# Patient Record
Sex: FEMALE | Race: WHITE | NOT HISPANIC OR LATINO | Employment: OTHER | URBAN - METROPOLITAN AREA
[De-identification: names, ages, dates, MRNs, and addresses within clinical notes are randomized per-mention and may not be internally consistent; named-entity substitution may affect disease eponyms.]

---

## 2017-04-20 DIAGNOSIS — E78.5 HYPERLIPIDEMIA LDL GOAL <130: ICD-10-CM

## 2017-04-20 DIAGNOSIS — I10 BENIGN ESSENTIAL HYPERTENSION: ICD-10-CM

## 2017-04-20 NOTE — TELEPHONE ENCOUNTER
[facility]iac      Last Written Prescription Date: 10/25/16  Last Fill Quantity: 90, # refills: 1  Last Office Visit with [facility] or [facility] prescribing provider: 10/25/16  juana long         Potassium   Date Value Ref Range Status   11/03/2016 3.8 3.4 - 5.3 mmol/L Final     Creatinine   Date Value Ref Range Status   11/03/2016 0.92 0.52 - 1.04 mg/dL Final     BP Readings from Last 3 Encounters:   10/25/16 122/86   10/22/15 122/80   08/04/14 122/80     Lovastatin     Last Written Prescription Date: 10/25/16  Last Fill Quantity: 90, # refills: 1  Last Office Visit with [facility] or [facility] prescribing provider: 10/25/16  juana long         Lab Results   Component Value Date    CHOL 173 11/03/2016     Lab Results   Component Value Date    HDL 62 11/03/2016     Lab Results   Component Value Date    LDL 95 11/03/2016     Lab Results   Component Value Date    TRIG 79 11/03/2016     Lab Results   Component Value Date    CHOLHDLRATIO 2.7 10/16/2015

## 2017-04-21 RX ORDER — BISOPROLOL FUMARATE AND HYDROCHLOROTHIAZIDE 2.5; 6.25 MG/1; MG/1
TABLET ORAL
Qty: 90 TABLET | Refills: 1 | Status: SHIPPED | OUTPATIENT
Start: 2017-04-21 | End: 2017-10-26

## 2017-04-21 RX ORDER — LOVASTATIN 20 MG
TABLET ORAL
Qty: 90 TABLET | Refills: 1 | Status: SHIPPED | OUTPATIENT
Start: 2017-04-21 | End: 2017-10-26

## 2017-10-26 ENCOUNTER — OFFICE VISIT (OUTPATIENT)
Dept: FAMILY MEDICINE | Facility: CLINIC | Age: 67
End: 2017-10-26
Payer: COMMERCIAL

## 2017-10-26 VITALS
WEIGHT: 204 LBS | HEART RATE: 69 BPM | HEIGHT: 65 IN | BODY MASS INDEX: 33.99 KG/M2 | OXYGEN SATURATION: 98 % | SYSTOLIC BLOOD PRESSURE: 129 MMHG | TEMPERATURE: 97.5 F | RESPIRATION RATE: 12 BRPM | DIASTOLIC BLOOD PRESSURE: 80 MMHG

## 2017-10-26 DIAGNOSIS — Z12.31 ENCOUNTER FOR SCREENING MAMMOGRAM FOR BREAST CANCER: ICD-10-CM

## 2017-10-26 DIAGNOSIS — Z78.0 POST-MENOPAUSAL: ICD-10-CM

## 2017-10-26 DIAGNOSIS — Z12.11 SCREENING FOR MALIGNANT NEOPLASM OF COLON: ICD-10-CM

## 2017-10-26 DIAGNOSIS — Z00.00 MEDICARE ANNUAL WELLNESS VISIT, SUBSEQUENT: Primary | ICD-10-CM

## 2017-10-26 DIAGNOSIS — E78.5 HYPERLIPIDEMIA LDL GOAL <130: ICD-10-CM

## 2017-10-26 DIAGNOSIS — I10 HYPERTENSION GOAL BP (BLOOD PRESSURE) < 140/90: ICD-10-CM

## 2017-10-26 DIAGNOSIS — Z23 NEED FOR PROPHYLACTIC VACCINATION AND INOCULATION AGAINST INFLUENZA: ICD-10-CM

## 2017-10-26 LAB
ANION GAP SERPL CALCULATED.3IONS-SCNC: 8 MMOL/L (ref 3–14)
BUN SERPL-MCNC: 13 MG/DL (ref 7–30)
CALCIUM SERPL-MCNC: 9.6 MG/DL (ref 8.5–10.1)
CHLORIDE SERPL-SCNC: 102 MMOL/L (ref 94–109)
CHOLEST SERPL-MCNC: 212 MG/DL
CO2 SERPL-SCNC: 27 MMOL/L (ref 20–32)
CREAT SERPL-MCNC: 0.86 MG/DL (ref 0.52–1.04)
GFR SERPL CREATININE-BSD FRML MDRD: 66 ML/MIN/1.7M2
GLUCOSE SERPL-MCNC: 93 MG/DL (ref 70–99)
HDLC SERPL-MCNC: 99 MG/DL
LDLC SERPL CALC-MCNC: 98 MG/DL
NONHDLC SERPL-MCNC: 113 MG/DL
POTASSIUM SERPL-SCNC: 3.9 MMOL/L (ref 3.4–5.3)
SODIUM SERPL-SCNC: 137 MMOL/L (ref 133–144)
TRIGL SERPL-MCNC: 75 MG/DL

## 2017-10-26 PROCEDURE — 90662 IIV NO PRSV INCREASED AG IM: CPT | Performed by: FAMILY MEDICINE

## 2017-10-26 PROCEDURE — 36415 COLL VENOUS BLD VENIPUNCTURE: CPT | Performed by: FAMILY MEDICINE

## 2017-10-26 PROCEDURE — G0008 ADMIN INFLUENZA VIRUS VAC: HCPCS | Performed by: FAMILY MEDICINE

## 2017-10-26 PROCEDURE — 80048 BASIC METABOLIC PNL TOTAL CA: CPT | Performed by: FAMILY MEDICINE

## 2017-10-26 PROCEDURE — 80061 LIPID PANEL: CPT | Performed by: FAMILY MEDICINE

## 2017-10-26 PROCEDURE — G0438 PPPS, INITIAL VISIT: HCPCS | Performed by: FAMILY MEDICINE

## 2017-10-26 RX ORDER — BISOPROLOL FUMARATE AND HYDROCHLOROTHIAZIDE 2.5; 6.25 MG/1; MG/1
1 TABLET ORAL DAILY
Qty: 90 TABLET | Refills: 3 | Status: SHIPPED | OUTPATIENT
Start: 2017-10-26 | End: 2018-08-10

## 2017-10-26 RX ORDER — LOVASTATIN 20 MG
20 TABLET ORAL AT BEDTIME
Qty: 90 TABLET | Refills: 3 | Status: SHIPPED | OUTPATIENT
Start: 2017-10-26 | End: 2018-08-10

## 2017-10-26 NOTE — NURSING NOTE
"Chief Complaint   Patient presents with     Medicare Visit       Initial /80  Pulse 69  Temp 97.5  F (36.4  C) (Oral)  Resp 12  Ht 5' 5.25\" (1.657 m)  Wt 204 lb (92.5 kg)  SpO2 98%  BMI 33.69 kg/m2 Estimated body mass index is 33.69 kg/(m^2) as calculated from the following:    Height as of this encounter: 5' 5.25\" (1.657 m).    Weight as of this encounter: 204 lb (92.5 kg).  Medication Reconciliation: complete       Gladys Kirk MA     "

## 2017-10-26 NOTE — MR AVS SNAPSHOT
After Visit Summary   10/26/2017    Serena Reno    MRN: [id number]           Patient Information     Date Of Birth          1950        Visit Information        Provider Department      10/26/2017 9:20 AM Zoya Elizabeth MD [facility]        Today's Diagnoses     Medicare annual wellness visit, subsequent    -  1    Hypertension goal BP (blood pressure) < 140/90        Hyperlipidemia LDL goal <130        Post-menopausal        Encounter for screening mammogram for breast cancer        Screening for malignant neoplasm of colon        Need for prophylactic vaccination and inoculation against influenza          Care Instructions    1. Switch to a baby aspirin 81 mg.  2. Schedule mammogram.  3. Schedule colonoscopy at [facility].  4. Schedule a bone density scan.  5. Bring your Advanced Directive into clinic when you have time so we can have a copy on file.      Preventive Health Recommendations    Female Ages 65 +    Yearly exam:     See your health care provider every year in order to  o Review health changes.   o Discuss preventive care.    o Review your medicines if your doctor has prescribed any.      You no longer need a yearly Pap test unless you've had an abnormal Pap test in the past 10 years. If you have vaginal symptoms, such as bleeding or discharge, be sure to talk with your provider about a Pap test.      Every 1 to 2 years, have a mammogram.  If you are over 69, talk with your health care provider about whether or not you want to continue having screening mammograms.      Every 10 years, have a colonoscopy. Or, have a yearly FIT test (stool test). These exams will check for colon cancer.       Have a cholesterol test every 5 years, or more often if your doctor advises it.       Have a diabetes test (fasting glucose) every three years. If you are at risk for diabetes, you should have this test more often.       At age 65, have a bone density scan (DEXA) to check for  osteoporosis (brittle bone disease).    Shots:    Get a flu shot each year.    Get a tetanus shot every 10 years.    Talk to your doctor about your pneumonia vaccines. There are now two you should receive - Pneumovax (PPSV 23) and Prevnar (PCV 13).    Talk to your doctor about the shingles vaccine.    Talk to your doctor about the hepatitis B vaccine.    Nutrition:     Eat at least 5 servings of fruits and vegetables each day.      Eat whole-grain bread, whole-wheat pasta and brown rice instead of white grains and rice.      Talk to your provider about Calcium and Vitamin D.     Lifestyle    Exercise at least 150 minutes a week (30 minutes a day, 5 days a week). This will help you control your weight and prevent disease.      Limit alcohol to one drink per day.      No smoking.       Wear sunscreen to prevent skin cancer.       See your dentist twice a year for an exam and cleaning.      See your eye doctor every 1 to 2 years to screen for conditions such as glaucoma, macular degeneration and cataracts.      You need a colonoscopy:  This is a lifesaving screening test - please call to set up an appointment today.  [phone] ([facility])  or   [phone] ([address])           Follow-ups after your visit        Additional Services     GASTROENTEROLOGY ADULT REF PROCEDURE ONLY       Last Lab Result: Creatinine (mg/dL)       Date                     Value                 11/03/2016               0.92             ----------  There is no height or weight on file to calculate BMI.      Patient will be contacted to schedule procedure.     Please be aware that coverage of these services is subject to the terms and limitations of your health insurance plan.  Call member services at your health plan with any benefit or coverage questions.  Any procedures must be performed at a [facility] facility OR coordinated by your clinic's referral office.    Please bring the following with you to your  appointment:    (1) Any X-Rays, CTs or MRIs which have been performed.  Contact the facility where they were done to arrange for  prior to your scheduled appointment.    (2) List of current medications   (3) This referral request   (4) Any documents/labs given to you for this referral                  Future tests that were ordered for you today     Open Future Orders        Priority Expected Expires Ordered    *MA Screening Digital Bilateral Routine  10/26/2018 10/26/2017    DX Hip/Pelvis/Spine Routine  10/26/2018 10/26/2017            Who to contact     If you have questions or need follow up information about today's clinic visit or your schedule please contact [facility] directly at [phone].  Normal or non-critical lab and imaging results will be communicated to you by [employer], letter or phone within 4 business days after the clinic has received the results. If you do not hear from us within 7 days, please contact the clinic through [employer] or phone. If you have a critical or abnormal lab result, we will notify you by phone as soon as possible.  Submit refill requests through [employer] or call your pharmacy and they will forward the refill request to us. Please allow 3 business days for your refill to be completed.          Additional Information About Your Visit        [employer] Information     [employer] gives you secure access to your electronic health record. If you see a primary care provider, you can also send messages to your care team and make appointments. If you have questions, please call your primary care clinic.  If you do not have a primary care provider, please call [phone] and they will assist you.        Care EveryWhere ID     This is your Care EveryWhere ID. This could be used by other organizations to access your [facility] medical records  [id number]        Your Vitals Were     Pulse Temperature Respirations Height Pulse Oximetry BMI (Body Mass Index)    69 97.5  F  "(36.4  C) (Oral) 12 5' 5.25\" (1.657 m) 98% 33.69 kg/m2       Blood Pressure from Last 3 Encounters:   10/26/17 129/80   10/25/16 122/86   10/22/15 122/80    Weight from Last 3 Encounters:   10/26/17 204 lb (92.5 kg)   10/25/16 202 lb 8 oz (91.9 kg)   10/22/15 197 lb 4 oz (89.5 kg)              We Performed the Following     Basic metabolic panel  (Ca, Cl, CO2, Creat, Gluc, K, Na, BUN)     FLU VACCINE, INCREASED ANTIGEN, PRESV FREE, AGE 65+ [91076]     GASTROENTEROLOGY ADULT REF PROCEDURE ONLY     Lipid panel reflex to direct LDL Fasting     Vaccine Administration, Initial [17781]          Today's Medication Changes          These changes are accurate as of: 10/26/17  9:59 AM.  If you have any questions, ask your nurse or doctor.               These medicines have changed or have updated prescriptions.        Dose/Directions    bisoprolol-hydrochlorothiazide 2.5-6.25 MG per tablet   Commonly known as:  ZIAC   This may have changed:  See the new instructions.   Used for:  Hypertension goal BP (blood pressure) < 140/90   Changed by:  Zoya Elizabeth MD        Dose:  1 tablet   Take 1 tablet by mouth daily   Quantity:  90 tablet   Refills:  3       lovastatin 20 MG tablet   Commonly known as:  MEVACOR   This may have changed:  See the new instructions.   Used for:  Hyperlipidemia LDL goal <130   Changed by:  Zoya Elizabeth MD        Dose:  20 mg   Take 1 tablet (20 mg) by mouth At Bedtime   Quantity:  90 tablet   Refills:  3            Where to get your medicines      These medications were sent to [facility] - [place], [place] - [address]  [address], [facility] [address]     Phone:  [phone]     bisoprolol-hydrochlorothiazide 2.5-6.25 MG per tablet    lovastatin 20 MG tablet                Primary Care Provider Office Phone # Fax #    Zoya Elizabeth [phone] [address] [address]        Equal Access to Services     UGO BUENROSTRO AH: Hadii " [address], wajackieda lubriceadaha, qaybta kaalmada [place], waxdeanna zahra olivowalodmiriam clement mia. So [facility] [phone].    ATENCIÓN: Si habla willyañol, tiene a chaves disposición servicios gratuitos de asistencia lingüística. Melissaame al [phone].    We comply with applicable federal civil rights laws and [place] laws. We do not discriminate on the basis of race, color, national origin, age, disability, sex, sexual orientation, or gender identity.            Thank you!     Thank you for choosing [facility]  for your care. Our goal is always to provide you with excellent care. Hearing back from our patients is one way we can continue to improve our services. Please take a few minutes to complete the written survey that you may receive in the mail after your visit with us. Thank you!             Your Updated Medication List - Protect others around you: Learn how to safely use, store and throw away your medicines at www.disposemymeds.org.          This list is accurate as of: 10/26/17  9:59 AM.  Always use your most recent med list.                   Brand Name Dispense Instructions for use Diagnosis    aspirin 325 MG tablet      Take 1 tablet by mouth daily.        bisoprolol-hydrochlorothiazide 2.5-6.25 MG per tablet    ZIAC    90 tablet    Take 1 tablet by mouth daily    Hypertension goal BP (blood pressure) < 140/90       co-enzyme Q-10 100 MG Caps capsule      Take 1 capsule by mouth daily.        GLUCOSAMINE CHONDR 1500 COMPLX PO      Take 1 capsule by mouth daily.        lovastatin 20 MG tablet    MEVACOR    90 tablet    Take 1 tablet (20 mg) by mouth At Bedtime    Hyperlipidemia LDL goal <130       MULTIVITAMINS PO      Take 1 capsule by mouth daily. Adult 50 plus        omega-3 fatty acids 1200 MG capsule      Take 1 capsule by mouth daily.        PRESERVISION AREDS 2 Caps      Take 2 capsules by mouth daily.        Vitamin C 500 MG Caps      Take 1 capsule by mouth daily.         vitamin E 400 UNIT capsule      Take 1 capsule by mouth daily.

## 2017-10-26 NOTE — PROGRESS NOTES
SUBJECTIVE:   Serena Reno is a 67 year old female who presents for Preventive Visit.  Are you in the first 12 months of your Medicare coverage?  No    Physical   Annual:     Getting at least 3 servings of Calcium per day::  Yes    Bi-annual eye exam::  Yes    Dental care twice a year::  Yes    Sleep apnea or symptoms of sleep apnea::  None    Diet::  Regular (no restrictions)    Frequency of exercise::  2-3 days/week    Duration of exercise::  30-45 minutes    Taking medications regularly::  Yes    Medication side effects::  None    Additional concerns today::  No    COGNITIVE SCREEN  1) Repeat 3 items (Banana, Sunrise, Chair)    2) Clock draw: NORMAL  3) 3 item recall: Recalls 3 objects  Results: 3 items recalled: COGNITIVE IMPAIRMENT LESS LIKELY  Mini-CogTM Copyright S Saray. Licensed by the author for use in [employer]; reprinted with permission (margie@[facility]). All rights reserved.      Answers for HPI/ROS submitted by the patient on 10/26/2017   PHQ-2 Score: 0      She woke up this morning with a hoarse voice from post nasal drip, which happens seasonally. She does not feel sick.    Patient has her Advanced Directive completed and will bring it into clinic to copy.     She recalls having a DEXA scan in 2011 and findings were normal.     She is exercising- walking daily and going to [employer] 2-3 times a week to do strength training exercises.      Reviewed and updated as needed this visit by clinical staff  Tobacco  Allergies  Meds  Med Hx  Surg Hx  Fam Hx  Soc Hx      Reviewed and updated as needed this visit by Provider        Social History   Substance Use Topics     Smoking status: Never Smoker     Smokeless tobacco: Never Used     Alcohol use Yes      Comment: wine 7 glasses a week     The patient does not drink >3 drinks per day nor >7 drinks per week.    Today's PHQ-2 Score:   PHQ-2 ( 1999 Pfizer) 10/26/2017   Q1: Little interest or pleasure in doing things 0   Q2: Feeling down,  depressed or hopeless 0   PHQ-2 Score 0   Q1: Little interest or pleasure in doing things Not at all   Q2: Feeling down, depressed or hopeless Not at all   PHQ-2 Score 0     Do you feel safe in your environment - Yes    Do you have a Health Care Directive?: Yes: Patient states has Advance Directive and will bring in a copy to clinic.    Current providers sharing in care for this patient include:   Patient Care Team:  Zoya Elizabeth MD as PCP - General (Family Practice)      Hearing impairment: No    Ability to successfully perform activities of daily living: Yes, no assistance needed     Fall risk:  Fallen 2 or more times in the past year?: No  Any fall with injury in the past year?: No    Home safety:  none identified    The following health maintenance items are reviewed in Epic and correct as of today:  Health Maintenance   Topic Date Due     BMP Q6 MOS  05/03/2017     ADVANCE DIRECTIVE PLANNING Q5 YRS  07/30/2017     INFLUENZA VACCINE (SYSTEM ASSIGNED)  09/01/2017     MAMMO SCREEN Q2 YR (SYSTEM ASSIGNED)  10/20/2017     FALL RISK ASSESSMENT  10/25/2017     LIPID MONITORING Q1 YEAR  11/03/2017     COLON CANCER SCREEN (SYSTEM ASSIGNED)  01/01/2018     TETANUS IMMUNIZATION (SYSTEM ASSIGNED)  01/01/2020     DEXA SCAN SCREENING (SYSTEM ASSIGNED)  Completed     PNEUMOCOCCAL  Completed     HEPATITIS C SCREENING  Completed     BP Readings from Last 3 Encounters:   10/26/17 129/80   10/25/16 122/86   10/22/15 122/80    Wt Readings from Last 3 Encounters:   10/26/17 92.5 kg (204 lb)   10/25/16 91.9 kg (202 lb 8 oz)   10/22/15 89.5 kg (197 lb 4 oz)         Patient Active Problem List   Diagnosis     Hypertension goal BP (blood pressure) < 140/90     Hyperlipidemia with target LDL less than 130     Borderline abnormal thyroid function test     Advanced directives, counseling/discussion     Past Surgical History:   Procedure Laterality Date     DILATION AND CURETTAGE      after missed AB       Social History   Substance  Use Topics     Smoking status: Never Smoker     Smokeless tobacco: Never Used     Alcohol use Yes      Comment: wine 7 glasses a week     History reviewed. No pertinent family history.      Current Outpatient Prescriptions   Medication Sig Dispense Refill     bisoprolol-hydrochlorothiazide (ZIAC) 2.5-6.25 MG per tablet TAKE 1 TABLET EVERY DAY 90 tablet 1     lovastatin (MEVACOR) 20 MG tablet TAKE 1 TABLET AT BEDTIME 90 tablet 1     Multiple Vitamin (MULTIVITAMINS PO) Take 1 capsule by mouth daily. Adult 50 plus       co-enzyme Q-10 (CO Q 10) 100 MG CAPS Take 1 capsule by mouth daily.       Glucosamine-Chondroit-Vit C-Mn (GLUCOSAMINE CHONDR 1500 COMPLX PO) Take 1 capsule by mouth daily.       aspirin 325 MG tablet Take 1 tablet by mouth daily.       omega-3 fatty acids (FISH OIL) 1200 MG capsule Take 1 capsule by mouth daily.       Ascorbic Acid (VITAMIN C) 500 MG CAPS Take 1 capsule by mouth daily.       vitamin E 400 UNIT capsule Take 1 capsule by mouth daily.       Multiple Vitamins-Minerals (PRESERVISION AREDS 2) CAPS Take 2 capsules by mouth daily.       No Known Allergies  Recent Labs   Lab Test  11/03/16   0929  10/16/15   0859  08/05/14   0850  08/01/13   0927  07/30/12   0931   LDL  95  89  112  94  90   HDL  62  63  84  73  80   TRIG  79  80  72  73  66   ALT   --   44  22  26  13   CR  0.92  0.83  0.89  0.86  0.78   GFRESTIMATED  61  69  64  67  75   GFRESTBLACK  74  83  77  81  >90   POTASSIUM  3.8  3.7  3.8  4.2  3.9   TSH   --    --    --   1.47  1.13      Pneumonia Vaccine: COMPLETED   Mammogram Screening: Patient over age 50, mutual decision to screen reflected in health maintenance.  History of abnormal Pap smear:   NO - age 65 - see link Cervical Cytology Screening Guidelines Last 3 Pap Results:   PAP (no units)   Date Value   08/04/2014 NIL         Review of Systems   ROS: 10 point ROS neg other than the symptoms noted above in the HPI.    This document serves as a record of the services and  "decisions personally performed and made by Zoya Elizabeth MD. It was created on his/her behalf by Hillary Contreras, trained medical scribe. The creation of this document is based the provider's statements to the medical scribes.    Scribpierre Contreras, October 26, 2017  OBJECTIVE:   /80  Pulse 69  Temp 97.5  F (36.4  C) (Oral)  Resp 12  Ht 1.657 m (5' 5.25\")  Wt 92.5 kg (204 lb)  SpO2 98%  BMI 33.69 kg/m2 Estimated body mass index is 33.69 kg/(m^2) as calculated from the following:    Height as of this encounter: 1.657 m (5' 5.25\").    Weight as of this encounter: 92.5 kg (204 lb).  Physical Exam  GENERAL APPEARANCE: healthy, alert and no distress  EYES: Eyes grossly normal to inspection, PERRL and conjunctivae and sclerae normal  HENT: ear canals and TM's normal, nose and mouth without ulcers or lesions, oropharynx clear and oral mucous membranes moist  NECK: no adenopathy, no asymmetry, masses, or scars and thyroid normal to palpation  RESP: lungs clear to auscultation - no rales, rhonchi or wheezes  BREAST: normal without masses, tenderness or nipple discharge and no palpable axillary masses or adenopathy  CV: regular rate and rhythm, normal S1 S2, no S3 or S4, no murmur, click or rub, no peripheral edema and peripheral pulses strong  ABDOMEN: soft, nontender, no hepatosplenomegaly, no masses and bowel sounds normal  MS: no musculoskeletal defects are noted and gait is age appropriate without ataxia  SKIN: no suspicious lesions or rashes  NEURO: Normal strength and tone, sensory exam grossly normal, mentation intact and speech normal  PSYCH: mentation appears normal and affect normal/bright  ASSESSMENT / PLAN:   1. Medicare annual wellness visit, subsequent  She will schedule mammo, dexa, colonoscopy. Fasting labs today. Health maintenance utd     2. Hypertension goal BP (blood pressure) < 140/90  Controlled. Continues on bisoprolol-HCTZ 2.5-6.25 mg daily.   - Basic metabolic panel  (Ca, Cl, CO2, " "Creat, Gluc, K, Na, BUN)  - bisoprolol-hydrochlorothiazide (ZIAC) 2.5-6.25 MG per tablet; Take 1 tablet by mouth daily  Dispense: 90 tablet; Refill: 3    3. Hyperlipidemia LDL goal <130  Stable. Continues on lovastatin 20 mg daily.  - Lipid panel reflex to direct LDL Fasting  - lovastatin (MEVACOR) 20 MG tablet; Take 1 tablet (20 mg) by mouth At Bedtime  Dispense: 90 tablet; Refill: 3    4. Post-menopausal    - DX Hip/Pelvis/Spine; Future    5. Encounter for screening mammogram for breast cancer    - *MA Screening Digital Bilateral; Future    6. Screening for malignant neoplasm of colon    - GASTROENTEROLOGY ADULT REF PROCEDURE ONLY    7. Need for prophylactic vaccination and inoculation against influenza    - FLU VACCINE, INCREASED ANTIGEN, PRESV FREE, AGE 65+ [49138]  - Vaccine Administration, Initial [70338]      End of Life Planning:  Patient currently has an advanced directive: Yes.  Practitioner is supportive of decision.    COUNSELING:  Reviewed preventive health counseling, as reflected in patient instructions  Estimated body mass index is 33.69 kg/(m^2) as calculated from the following:    Height as of this encounter: 1.657 m (5' 5.25\").    Weight as of this encounter: 92.5 kg (204 lb).  Weight management plan: Discussed healthy diet and exercise guidelines and patient will follow up in 12 months in clinic to re-evaluate.   reports that she has never smoked. She has never used smokeless tobacco.    Appropriate preventive services were discussed with this patient, including applicable screening as appropriate for cardiovascular disease, diabetes, osteopenia/osteoporosis, and glaucoma.  As appropriate for age/gender, discussed screening for colorectal cancer, prostate cancer, breast cancer, and cervical cancer. Checklist reviewing preventive services available has been given to the patient.    Reviewed patients plan of care and provided an AVS. The Basic Care Plan (routine screening as documented in Health " Maintenance) for Serena meets the Care Plan requirement. This Care Plan has been established and reviewed with the Patient.    Counseling Resources:  ATP IV Guidelines  Pooled Cohorts Equation Calculator  Breast Cancer Risk Calculator  FRAX Risk Assessment  ICSI Preventive Guidelines  Dietary Guidelines for Americans, 2010  USDA's MyPlate  ASA Prophylaxis  Lung CA Screening    The information in this document, created by the medical scribe for me, accurately reflects the services I personally performed and the decisions made by me. I have reviewed and approved this document for accuracy. 10/26/17    Zoya Elizabeth MD  [facility]      Injectable Influenza Immunization Documentation    1.  Is the person to be vaccinated sick today?   No    2. Does the person to be vaccinated have an allergy to a component   of the vaccine?   No  Egg Allergy Algorithm Link    3. Has the person to be vaccinated ever had a serious reaction   to influenza vaccine in the past?   No    4. Has the person to be vaccinated ever had Guillain-Barré syndrome?   No    Form completed by Gladys Kirk MA

## 2017-10-26 NOTE — NURSING NOTE
Per orders of Dr. Elizabeth, injection of flu shot  given by OMARI VENTURA Patient instructed to remain in clinic for 15 minutes afterwards, and to report any adverse reaction to me immediately.

## 2017-10-26 NOTE — PATIENT INSTRUCTIONS
1. Switch to a baby aspirin 81 mg.  2. Schedule mammogram.  3. Schedule colonoscopy at [facility].  4. Schedule a bone density scan.  5. Bring your Advanced Directive into clinic when you have time so we can have a copy on file.      Preventive Health Recommendations    Female Ages 65 +    Yearly exam:     See your health care provider every year in order to  o Review health changes.   o Discuss preventive care.    o Review your medicines if your doctor has prescribed any.      You no longer need a yearly Pap test unless you've had an abnormal Pap test in the past 10 years. If you have vaginal symptoms, such as bleeding or discharge, be sure to talk with your provider about a Pap test.      Every 1 to 2 years, have a mammogram.  If you are over 69, talk with your health care provider about whether or not you want to continue having screening mammograms.      Every 10 years, have a colonoscopy. Or, have a yearly FIT test (stool test). These exams will check for colon cancer.       Have a cholesterol test every 5 years, or more often if your doctor advises it.       Have a diabetes test (fasting glucose) every three years. If you are at risk for diabetes, you should have this test more often.       At age 65, have a bone density scan (DEXA) to check for osteoporosis (brittle bone disease).    Shots:    Get a flu shot each year.    Get a tetanus shot every 10 years.    Talk to your doctor about your pneumonia vaccines. There are now two you should receive - Pneumovax (PPSV 23) and Prevnar (PCV 13).    Talk to your doctor about the shingles vaccine.    Talk to your doctor about the hepatitis B vaccine.    Nutrition:     Eat at least 5 servings of fruits and vegetables each day.      Eat whole-grain bread, whole-wheat pasta and brown rice instead of white grains and rice.      Talk to your provider about Calcium and Vitamin D.     Lifestyle    Exercise at least 150 minutes a week (30 minutes a day, 5 days a week). This  will help you control your weight and prevent disease.      Limit alcohol to one drink per day.      No smoking.       Wear sunscreen to prevent skin cancer.       See your dentist twice a year for an exam and cleaning.      See your eye doctor every 1 to 2 years to screen for conditions such as glaucoma, macular degeneration and cataracts.      You need a colonoscopy:  This is a lifesaving screening test - please call to set up an appointment today.  [phone] ([facility])  or   [phone] (Get In - [address] [place] [address])

## 2017-10-30 NOTE — PROGRESS NOTES
The results of your recent lipid (cholesterol) profile were normal.    Here are the results:  Lab Results       Component                Value               Date                       CHOL                     212                 10/26/2017            Lab Results       Component                Value               Date                       HDL                      99                  10/26/2017            Lab Results       Component                Value               Date                       LDL                      98                  10/26/2017            Lab Results       Component                Value               Date                       TRIG                     75                  10/26/2017            Lab Results       Component                Value               Date                       CHOLHDLRATIO             2.7                 10/16/2015              Desired or goal levels are:  CHOLESTEROL: Desirable is less than 200.   HDL (Good Cholesterol): Desirable is greater than 40 (for men) greater than 50 (for women).  LDL (Bad Cholesterol): Desirable is less than 130 (or less than 100 if you have heart disease or diabetes). Borderline 130-160.  TRIGLYCERIDES: Desirable is less than 150.  Borderline is 150-200.    Keep up the good work!.    As you may know, an elevated cholesterol is one factor that increases your risk for heart disease and stroke. You can improve your cholesterol by controlling the amount and type of fat you eat and by increasing your daily activity level.    Here are some ways to improve your nutrition:  Eat less fat (especially butter, Crisco and other saturated fats)  Buy lean cuts of meat, reduce your portions of red meat or substitute poultry or fish  Use skim milk and low-fat dairy products  Eat no more than 4 egg yolks per week  Avoid fried or fast foods that are high in fat  Eat more fruits and vegetables      Also consider starting or increasing your aerobic activity. Aerobic  activity is the best way to improve HDL (good) cholesterol. If this would be new to you, please talk with me first about what activities are safe for you.      Other lab results:    The testing of your blood sugar, kidney function, and electrolytes was normal.      Please feel free to contact us with any questions or if you would like more information.    Zoya Elizabeth M.D.

## 2017-10-31 ENCOUNTER — RADIANT APPOINTMENT (OUTPATIENT)
Dept: MAMMOGRAPHY | Facility: CLINIC | Age: 67
End: 2017-10-31
Payer: COMMERCIAL

## 2017-10-31 DIAGNOSIS — Z12.31 VISIT FOR SCREENING MAMMOGRAM: ICD-10-CM

## 2017-10-31 PROCEDURE — G0202 SCR MAMMO BI INCL CAD: HCPCS | Mod: TC

## 2018-12-08 ENCOUNTER — TRANSFERRED RECORDS (OUTPATIENT)
Dept: HEALTH INFORMATION MANAGEMENT | Facility: CLINIC | Age: 68
End: 2018-12-08

## 2018-12-08 LAB — HEMOCCULT STL QL IA: NEGATIVE

## 2018-12-17 ENCOUNTER — ANCILLARY PROCEDURE (OUTPATIENT)
Dept: MAMMOGRAPHY | Facility: CLINIC | Age: 68
End: 2018-12-17
Attending: FAMILY MEDICINE
Payer: COMMERCIAL

## 2018-12-17 DIAGNOSIS — Z12.31 VISIT FOR SCREENING MAMMOGRAM: ICD-10-CM

## 2018-12-17 PROCEDURE — 77067 SCR MAMMO BI INCL CAD: CPT | Mod: TC

## 2018-12-18 ASSESSMENT — ENCOUNTER SYMPTOMS
ARTHRALGIAS: 1
EYE PAIN: 0
PARESTHESIAS: 0
SHORTNESS OF BREATH: 0
DIARRHEA: 0
HEADACHES: 0
NAUSEA: 0
COUGH: 0
WEAKNESS: 0
MYALGIAS: 0
NERVOUS/ANXIOUS: 0
HEARTBURN: 0
BREAST MASS: 0
DYSURIA: 0
JOINT SWELLING: 0
FEVER: 0
CONSTIPATION: 0
HEMATURIA: 0
FREQUENCY: 0
DIZZINESS: 0
ABDOMINAL PAIN: 0
HEMATOCHEZIA: 0
CHILLS: 0
SORE THROAT: 0
PALPITATIONS: 0

## 2018-12-18 ASSESSMENT — ACTIVITIES OF DAILY LIVING (ADL): CURRENT_FUNCTION: NO ASSISTANCE NEEDED

## 2018-12-20 ENCOUNTER — OFFICE VISIT (OUTPATIENT)
Dept: FAMILY MEDICINE | Facility: CLINIC | Age: 68
End: 2018-12-20
Payer: COMMERCIAL

## 2018-12-20 VITALS
HEART RATE: 63 BPM | HEIGHT: 65 IN | BODY MASS INDEX: 34.66 KG/M2 | OXYGEN SATURATION: 97 % | DIASTOLIC BLOOD PRESSURE: 84 MMHG | TEMPERATURE: 97.8 F | SYSTOLIC BLOOD PRESSURE: 143 MMHG | WEIGHT: 208 LBS | RESPIRATION RATE: 16 BRPM

## 2018-12-20 DIAGNOSIS — Z00.00 ENCOUNTER FOR ROUTINE ADULT HEALTH EXAMINATION WITHOUT ABNORMAL FINDINGS: Primary | ICD-10-CM

## 2018-12-20 DIAGNOSIS — E78.5 HYPERLIPIDEMIA LDL GOAL <130: ICD-10-CM

## 2018-12-20 DIAGNOSIS — I10 HYPERTENSION GOAL BP (BLOOD PRESSURE) < 140/90: ICD-10-CM

## 2018-12-20 DIAGNOSIS — Z78.0 POSTMENOPAUSAL: ICD-10-CM

## 2018-12-20 DIAGNOSIS — E83.52 SERUM CALCIUM ELEVATED: ICD-10-CM

## 2018-12-20 DIAGNOSIS — B00.1 COLD SORE: ICD-10-CM

## 2018-12-20 DIAGNOSIS — Z12.11 COLON CANCER SCREENING: ICD-10-CM

## 2018-12-20 LAB
ANION GAP SERPL CALCULATED.3IONS-SCNC: 6 MMOL/L (ref 3–14)
BUN SERPL-MCNC: 16 MG/DL (ref 7–30)
CALCIUM SERPL-MCNC: 10.3 MG/DL (ref 8.5–10.1)
CHLORIDE SERPL-SCNC: 104 MMOL/L (ref 94–109)
CHOLEST SERPL-MCNC: 245 MG/DL
CO2 SERPL-SCNC: 28 MMOL/L (ref 20–32)
CREAT SERPL-MCNC: 0.87 MG/DL (ref 0.52–1.04)
CREAT UR-MCNC: 66 MG/DL
GFR SERPL CREATININE-BSD FRML MDRD: 68 ML/MIN/{1.73_M2}
GLUCOSE SERPL-MCNC: 97 MG/DL (ref 70–99)
HDLC SERPL-MCNC: 79 MG/DL
LDLC SERPL CALC-MCNC: 145 MG/DL
MICROALBUMIN UR-MCNC: <5 MG/L
MICROALBUMIN/CREAT UR: NORMAL MG/G CR (ref 0–25)
NONHDLC SERPL-MCNC: 166 MG/DL
POTASSIUM SERPL-SCNC: 4.1 MMOL/L (ref 3.4–5.3)
SODIUM SERPL-SCNC: 138 MMOL/L (ref 133–144)
TRIGL SERPL-MCNC: 105 MG/DL

## 2018-12-20 PROCEDURE — 99387 INIT PM E/M NEW PAT 65+ YRS: CPT | Mod: 25 | Performed by: FAMILY MEDICINE

## 2018-12-20 PROCEDURE — 80048 BASIC METABOLIC PNL TOTAL CA: CPT | Performed by: FAMILY MEDICINE

## 2018-12-20 PROCEDURE — 90715 TDAP VACCINE 7 YRS/> IM: CPT | Performed by: FAMILY MEDICINE

## 2018-12-20 PROCEDURE — 36415 COLL VENOUS BLD VENIPUNCTURE: CPT | Performed by: FAMILY MEDICINE

## 2018-12-20 PROCEDURE — 90471 IMMUNIZATION ADMIN: CPT | Performed by: FAMILY MEDICINE

## 2018-12-20 PROCEDURE — 82043 UR ALBUMIN QUANTITATIVE: CPT | Performed by: FAMILY MEDICINE

## 2018-12-20 PROCEDURE — 80061 LIPID PANEL: CPT | Performed by: FAMILY MEDICINE

## 2018-12-20 RX ORDER — ACYCLOVIR 50 MG/G
OINTMENT TOPICAL
Qty: 15 G | Refills: 1 | Status: SHIPPED | OUTPATIENT
Start: 2018-12-20 | End: 2018-12-27

## 2018-12-20 RX ORDER — LOVASTATIN 20 MG
20 TABLET ORAL AT BEDTIME
Qty: 90 TABLET | Refills: 3 | Status: SHIPPED | OUTPATIENT
Start: 2018-12-20 | End: 2019-12-19

## 2018-12-20 RX ORDER — BISOPROLOL FUMARATE AND HYDROCHLOROTHIAZIDE 2.5; 6.25 MG/1; MG/1
TABLET ORAL
Qty: 90 TABLET | Refills: 3 | Status: SHIPPED | OUTPATIENT
Start: 2018-12-20 | End: 2019-12-19

## 2018-12-20 ASSESSMENT — ENCOUNTER SYMPTOMS
SORE THROAT: 0
EYE PAIN: 0
PARESTHESIAS: 0
WEAKNESS: 0
ABDOMINAL PAIN: 0
HEMATURIA: 0
FEVER: 0
COUGH: 0
DIARRHEA: 0
DIZZINESS: 0
DYSURIA: 0
ARTHRALGIAS: 1
CONSTIPATION: 0
HEADACHES: 0
MYALGIAS: 0
JOINT SWELLING: 0
PALPITATIONS: 0
SHORTNESS OF BREATH: 0
FREQUENCY: 0
HEMATOCHEZIA: 0
NERVOUS/ANXIOUS: 0
HEARTBURN: 0
NAUSEA: 0
CHILLS: 0
BREAST MASS: 0

## 2018-12-20 ASSESSMENT — MIFFLIN-ST. JEOR: SCORE: 1466.42

## 2018-12-20 ASSESSMENT — ACTIVITIES OF DAILY LIVING (ADL): CURRENT_FUNCTION: NO ASSISTANCE NEEDED

## 2018-12-20 NOTE — NURSING NOTE
Prior to injection, verified patient identity using patient's name and date of birth.  Due to injection administration, patient instructed to remain in clinic for 15 minutes  afterwards, and to report any adverse reaction to me immediately.      Screening Questionnaire for Adult Immunization    Are you sick today?   No   Do you have allergies to medications, food, a vaccine component or latex?   No   Have you ever had a serious reaction after receiving a vaccination?   No   Do you have a long-term health problem with heart disease, lung disease, asthma, kidney disease, metabolic disease (e.g. diabetes), anemia, or other blood disorder?   No   Do you have cancer, leukemia, HIV/AIDS, or any other immune system problem?   No   In the past 3 months, have you taken medications that affect  your immune system, such as prednisone, other steroids, or anticancer drugs; drugs for the treatment of rheumatoid arthritis, Crohn s disease, or psoriasis; or have you had radiation treatments?   No   Have you had a seizure, or a brain or other nervous system problem?   No   During the past year, have you received a transfusion of blood or blood     products, or been given immune (gamma) globulin or antiviral drug?   No   For women: Are you pregnant or is there a chance you could become        pregnant during the next month?   No   Have you received any vaccinations in the past 4 weeks?   No     Immunization questionnaire answers were all negative.        Per orders of Dr. Elizabeth, injection of Tdap given by Brad Isaac. Patient instructed to remain in clinic for 15 minutes afterwards, and to report any adverse reaction to me immediately.       Screening performed by Brad Isaac on 12/20/2018 at 10:28 AM.

## 2018-12-20 NOTE — PATIENT INSTRUCTIONS
1) I recommend getting the new shingles vaccine, Shingrix.  You can call your insurance company to find out if this vaccine is covered.  You may also ask our pharmacy to check if your insurance covers Shingrix if given at the pharmacy.   2) Schedule your bone density test.  3) Tomorrow, you have committed to start exercising with your walking tape once daily. Have your clothes ready by your bed in the morning. You can do it!!         Preventive Health Recommendations    See your health care provider every year to    Review health changes.     Discuss preventive care.      Review your medicines if your doctor has prescribed any.      You no longer need a yearly Pap test unless you've had an abnormal Pap test in the past 10 years. If you have vaginal symptoms, such as bleeding or discharge, be sure to talk with your provider about a Pap test.      Every 1 to 2 years, have a mammogram.  If you are over 69, talk with your health care provider about whether or not you want to continue having screening mammograms.      Every 10 years, have a colonoscopy. Or, have a yearly FIT test (stool test). These exams will check for colon cancer.       Have a cholesterol test every 5 years, or more often if your doctor advises it.       Have a diabetes test (fasting glucose) every three years. If you are at risk for diabetes, you should have this test more often.       At age 65, have a bone density scan (DEXA) to check for osteoporosis (brittle bone disease).    Shots:    Get a flu shot each year.    Get a tetanus shot every 10 years.    Talk to your doctor about your pneumonia vaccines. There are now two you should receive - Pneumovax (PPSV 23) and Prevnar (PCV 13).    Talk to your pharmacist about the shingles vaccine.    Talk to your doctor about the hepatitis B vaccine.    Nutrition:     Eat at least 5 servings of fruits and vegetables each day.      Eat whole-grain bread, whole-wheat pasta and brown rice instead of white grains  and rice.      Get adequate Calcium and Vitamin D.     Lifestyle    Exercise at least 150 minutes a week (30 minutes a day, 5 days a week). This will help you control your weight and prevent disease.      Limit alcohol to one drink per day.      No smoking.       Wear sunscreen to prevent skin cancer.       See your dentist twice a year for an exam and cleaning.      See your eye doctor every 1 to 2 years to screen for conditions such as glaucoma, macular degeneration and cataracts.    Personalized Prevention Plan  You are due for the preventive services outlined below.  Your care team is available to assist you in scheduling these services.  If you have already completed any of these items, please share that information with your care team to update in your medical record.  Health Maintenance Due   Topic Date Due     Diptheria Tetanus Pertussis (DTAP/TDAP/TD) Vaccine (1 - Tdap) 07/31/1975     Zoster (Chicken Pox) Vaccine (2 of 3) 09/29/2014     Discuss Advance Directive Planning  07/30/2017     Colon Cancer Screening - every 10 years.  01/01/2018     Basic Metabolic Lab - every 6 months  04/26/2018     FALL RISK ASSESSMENT  10/26/2018     Cholesterol Lab - yearly  10/26/2018

## 2018-12-20 NOTE — PROGRESS NOTES
"SUBJECTIVE:   Serena Reno is a 68 year old female who presents for Preventive Visit.    Are you in the first 12 months of your Medicare coverage?  No    Annual Wellness Visit     In general, how would you rate your overall health?  Good    Frequency of exercise:  2-3 days/week    Do you usually eat at least 4 servings of fruit and vegetables a day, include whole grains    & fiber and avoid regularly eating high fat or \"junk\" foods?  Yes    Taking medications regularly:  Yes    Medication side effects:  None    Ability to successfully perform activities of daily living:  No assistance needed    Home Safety:  Lack of grab bars in the bathroom    Hearing Impairment:  No hearing concerns    In the past 6 months, have you been bothered by leaking of urine?  No    In general, how would you rate your overall mental or emotional health?  Excellent    PHQ-2 Total Score: 0    Additional concerns today:  No    Do you feel safe in your environment? Yes    Do you have a Health Care Directive? Yes: Advance Directive has been received and scanned.    Fall risk    Cognitive Screening   1) Repeat 3 items (Leader, Season, Table)    2) Clock draw: NORMAL  3) 3 item recall: Recalls 3 objects  Results: 3 items recalled: COGNITIVE IMPAIRMENT LESS LIKELY    Mini-CogTM Copyright S Saray. Licensed by the author for use in [facility]; reprinted with permission (sohumphrey@.[email or web address]). All rights reserved.      Do you have sleep apnea, excessive snoring or daytime drowsiness?: no    Reviewed and updated as needed this visit by clinical staff  Tobacco  Allergies  Meds  Med Hx  Surg Hx  Fam Hx  Soc Hx        Reviewed and updated as needed this visit by Provider        Social History     Tobacco Use     Smoking status: Never Smoker     Smokeless tobacco: Never Used   Substance Use Topics     Alcohol use: Yes     Comment: wine 7 glasses a week       Alcohol Use 12/18/2018   If you drink alcohol do you typically have greater than 3 " drinks per day OR greater than 7 drinks per week? No               Current providers sharing in care for this patient include:   Patient Care Team:  Zoya Elizabeth MD as PCP - General (Family Practice)  Zoya Elizabeth MD as PCP - Assigned PCP    The following health maintenance items are reviewed in Epic and correct as of today:  Health Maintenance   Topic Date Due     DTAP/TDAP/TD IMMUNIZATION (1 - Tdap) 07/31/1975     ZOSTER IMMUNIZATION (2 of 3) 09/29/2014     ADVANCE DIRECTIVE PLANNING Q5 YRS  07/30/2017     COLON CANCER SCREEN (SYSTEM ASSIGNED)  01/01/2018     BMP Q6 MOS  04/26/2018     FALL RISK ASSESSMENT  10/26/2018     LIPID MONITORING Q1 YEAR  10/26/2018     PHQ-2 Q1 YR  12/20/2019     MAMMO SCREEN Q2 YR (SYSTEM ASSIGNED)  12/17/2020     DEXA SCAN SCREENING (SYSTEM ASSIGNED)  Completed     INFLUENZA VACCINE  Completed     PNEUMOVAX IMMUNIZATION 65+ LOW/MEDIUM RISK  Completed     HEPATITIS C SCREENING  Completed     IPV IMMUNIZATION  Aged Out     MENINGITIS IMMUNIZATION  Aged Out     BP Readings from Last 3 Encounters:   12/20/18 143/84   10/26/17 129/80   10/25/16 122/86    Wt Readings from Last 3 Encounters:   12/20/18 94.3 kg (208 lb)   10/26/17 92.5 kg (204 lb)   10/25/16 91.9 kg (202 lb 8 oz)                  Patient Active Problem List   Diagnosis     Hypertension goal BP (blood pressure) < 140/90     Hyperlipidemia with target LDL less than 130     Advanced directives, counseling/discussion     Past Surgical History:   Procedure Laterality Date     DILATION AND CURETTAGE      after missed AB       Social History     Tobacco Use     Smoking status: Never Smoker     Smokeless tobacco: Never Used   Substance Use Topics     Alcohol use: Yes     Comment: wine 7 glasses a week     History reviewed. No pertinent family history.      Current Outpatient Medications   Medication Sig Dispense Refill     Ascorbic Acid (VITAMIN C) 500 MG CAPS Take 1 capsule by mouth daily.       aspirin (ASA) 81 MG tablet 81  mg       aspirin (ASA) 81 MG tablet Take 1 tablet by mouth daily.       bisoprolol-hydrochlorothiazide (ZIAC) 2.5-6.25 MG per tablet TAKE 1 TABLET EVERY DAY 90 tablet 3     co-enzyme Q-10 (CO Q 10) 100 MG CAPS Take 1 capsule by mouth daily.       Glucosamine-Chondroit-Vit C-Mn (GLUCOSAMINE CHONDR 1500 COMPLX PO) Take 2 capsules by mouth daily        lovastatin (MEVACOR) 20 MG tablet TAKE 1 TABLET AT BEDTIME 90 tablet 3     Multiple Vitamin (MULTIVITAMINS PO) Take 1 capsule by mouth daily. Adult 50 plus       Multiple Vitamins-Minerals (PRESERVISION AREDS 2) CAPS Take 2 capsules by mouth daily.       omega-3 fatty acids (FISH OIL) 1200 MG capsule Take 1 capsule by mouth daily.       vitamin E 400 UNIT capsule Take 1 capsule by mouth daily.       No Known Allergies  Recent Labs   Lab Test 10/26/17  1021 11/03/16  0929 10/16/15  0859 08/05/14  0850 08/01/13  0927 07/30/12  0931   LDL 98 95 89 112 94 90   HDL 99 62 63 84 73 80   TRIG 75 79 80 72 73 66   ALT  --   --  44 22 26 13   CR 0.86 0.92 0.83 0.89 0.86 0.78   GFRESTIMATED 66 61 69 64 67 75   GFRESTBLACK 79 74 83 77 81 >90   POTASSIUM 3.9 3.8 3.7 3.8 4.2 3.9   TSH  --   --   --   --  1.47 1.13          Review of Systems   Constitutional: Negative for chills and fever.   HENT: Negative for congestion, ear pain, hearing loss and sore throat.    Eyes: Negative for pain and visual disturbance.   Respiratory: Negative for cough and shortness of breath.    Cardiovascular: Negative for chest pain, palpitations and peripheral edema.   Gastrointestinal: Negative for abdominal pain, constipation, diarrhea, heartburn, hematochezia and nausea.   Breasts:  Negative for tenderness, breast mass and discharge.   Genitourinary: Negative for dysuria, frequency, genital sores, hematuria, pelvic pain, urgency, vaginal bleeding and vaginal discharge.   Musculoskeletal: Positive for arthralgias. Negative for joint swelling and myalgias.   Skin: Negative for rash.   Neurological:  "Negative for dizziness, weakness, headaches and paresthesias.   Psychiatric/Behavioral: Negative for mood changes. The patient is not nervous/anxious.          OBJECTIVE:   /84   Pulse 63   Temp 97.8  F (36.6  C) (Oral)   Resp 16   Ht 1.638 m (5' 4.5\")   Wt 94.3 kg (208 lb)   SpO2 97%   BMI 35.15 kg/m   Estimated body mass index is 35.15 kg/m  as calculated from the following:    Height as of this encounter: 1.638 m (5' 4.5\").    Weight as of this encounter: 94.3 kg (208 lb).   BP Readings from Last 6 Encounters:   12/20/18 143/84   10/26/17 129/80   10/25/16 122/86   10/22/15 122/80   08/04/14 122/80   08/01/13 124/74      Wt Readings from Last 5 Encounters:   12/20/18 94.3 kg (208 lb)   10/26/17 92.5 kg (204 lb)   10/25/16 91.9 kg (202 lb 8 oz)   10/22/15 89.5 kg (197 lb 4 oz)   08/04/14 83.1 kg (183 lb 1.9 oz)      Physical Exam  GENERAL: healthy, alert and no distress  EYES: Eyes grossly normal to inspection, PERRL and conjunctivae and sclerae normal  HENT: ear canals and TM's normal, nose and mouth without ulcers or lesions  NECK: no adenopathy, no asymmetry, masses, or scars and thyroid normal to palpation  RESP: lungs clear to auscultation - no rales, rhonchi or wheezes  BREAST: normal without masses, tenderness or nipple discharge and no palpable axillary masses or adenopathy  CV: regular rate and rhythm, normal S1 S2, no S3 or S4, no murmur, click or rub, no peripheral edema and peripheral pulses strong  ABDOMEN: soft, nontender, no hepatosplenomegaly, no masses and bowel sounds normal  MS: no gross musculoskeletal defects noted, no edema  SKIN: no suspicious lesions or rashes  NEURO: Normal strength and tone, mentation intact and speech normal  PSYCH: mentation appears normal, affect normal/bright    Diagnostic Test Results:  none     ASSESSMENT / PLAN:   1. Encounter for routine adult health examination without abnormal findings  mammo Is utd  She completed her FIT test and it was normal. " "      2. Hypertension goal BP (blood pressure) < 140/90  Initially slightly elevated, norma recheck with MA   - bisoprolol-hydrochlorothiazide (ZIAC) 2.5-6.25 MG tablet; TAKE 1 TABLET EVERY DAY  Dispense: 90 tablet; Refill: 3  - Basic metabolic panel  - Albumin Random Urine Quantitative with Creat Ratio    3. Hyperlipidemia LDL goal <130  Controlled.   - lovastatin (MEVACOR) 20 MG tablet; Take 1 tablet (20 mg) by mouth At Bedtime  Dispense: 90 tablet; Refill: 3  - Lipid panel reflex to direct LDL Fasting    4. Colon cancer screening  She received a fit test from her insurance company and return to that.  She was told the results was normal.  That will be sent to our clinic soon.  She does not plan on colonoscopy this year, but will discuss again next year when she is due again for screening.  - GASTROENTEROLOGY ADULT REF PROCEDURE ONLY [facility] [phone]    5. Postmenopausal     - DX Hip/Pelvis/Spine; Future    6. Cold sore     - acyclovir (ZOVIRAX) 5 % external ointment; Apply topically 5 times daily for 7 days  Dispense: 15 g; Refill: 1    End of Life Planning:  Patient currently has an advanced directive: Yes.  Practitioner is supportive of decision.    COUNSELING:  Reviewed preventive health counseling, as reflected in patient instructions    BP Readings from Last 1 Encounters:   12/20/18 143/84     Estimated body mass index is 35.15 kg/m  as calculated from the following:    Height as of this encounter: 1.638 m (5' 4.5\").    Weight as of this encounter: 94.3 kg (208 lb).      Weight management plan: Discussed healthy diet and exercise guidelines diet and exercise     reports that  has never smoked. she has never used smokeless tobacco.      Appropriate preventive services were discussed with this patient, including applicable screening as appropriate for cardiovascular disease, diabetes, osteopenia/osteoporosis, and glaucoma.  As appropriate for age/gender, discussed screening for colorectal cancer, " prostate cancer, breast cancer, and cervical cancer. Checklist reviewing preventive services available has been given to the patient.    Reviewed patients plan of care and provided an AVS. The Intermediate Care Plan ( asthma action plan, low back pain action plan, and migraine action plan) for Serena meets the Care Plan requirement. This Care Plan has been established and reviewed with the Patient.    Counseling Resources:  ATP IV Guidelines  Pooled Cohorts Equation Calculator  Breast Cancer Risk Calculator  FRAX Risk Assessment  ICSI Preventive Guidelines  Dietary Guidelines for Americans, 2010  USDA's MyPlate  ASA Prophylaxis  Lung CA Screening    Zoya Elizabeth MD, MD  [facility]

## 2018-12-20 NOTE — LETTER
December 28, 2018      Serena Reno  [address] [facility] [address] [place] [id number]        Dear ,    We are writing to inform you of your test results.    The results of your recent lipid (cholesterol) profile were abnormal.     Here are the results:   Lab Results        Component                Value               Date                        CHOL                     245                 12/20/2018             Lab Results        Component                Value               Date                        HDL                      79                  12/20/2018             Lab Results        Component                Value               Date                        LDL                      145                 12/20/2018             Lab Results        Component                Value               Date                        TRIG                     105                 12/20/2018             Lab Results        Component                Value               Date                        CHOLHDLRATIO             2.7                 10/16/2015               Desired or goal levels are:   CHOLESTEROL: Desirable is less than 200.   HDL (Good Cholesterol): Desirable is greater than 40 (for men) greater than 50 (for women).   LDL (Bad Cholesterol): Desirable is less than 130 (or less than 100 if you have heart disease or diabetes). Borderline 130-160.   TRIGLYCERIDES: Desirable is less than 150.  Borderline is 150-200.     Please continue the same dose of your cholesterol medication(s)   Your LDL was a little higher than it has been in recent years.  This can happen if you have missed some lovastatin recently.  If you have not been missing your medication and it might be worth rechecking your lipids and possibly changing or adjusting your medication if the LDL remains high.     As you may know, an elevated cholesterol is one factor that increases your risk for heart disease and stroke. You can improve your  cholesterol by controlling the amount and type of fat you eat and by increasing your daily activity level.     Here are some ways to improve your nutrition:   Eat less fat (especially butter, Crisco and other saturated fats)   Buy lean cuts of meat, reduce your portions of red meat or substitute poultry or fish   Use skim milk and low-fat dairy products   Eat no more than 4 egg yolks per week   Avoid fried or fast foods that are high in fat   Eat more fruits and vegetables       Also consider starting or increasing your aerobic activity. Aerobic activity is the best way to improve HDL (good) cholesterol. If this would be new to you, please talk with me first about what activities are safe for you.       Other lab results:   Your urine protein test was normal.     Your calcium level was slightly high, otherwise your metabolic panel is stable.  I recommend returning for repeat calcium check.  He can schedule this as a lab visit.  I will place the order in advance.   Please feel free to contact us with any questions or if you would like more information.     Resulted Orders   Lipid panel reflex to direct LDL Fasting   Result Value Ref Range    Cholesterol 245 (H) <200 mg/dL      Comment:      Desirable:       <200 mg/dl    Triglycerides 105 <150 mg/dL      Comment:      Fasting specimen    HDL Cholesterol 79 >49 mg/dL    LDL Cholesterol Calculated 145 (H) <100 mg/dL      Comment:      Above desirable:  100-129 mg/dl  Borderline High:  130-159 mg/dL  High:             160-189 mg/dL  Very high:       >189 mg/dl      Non HDL Cholesterol 166 (H) <130 mg/dL      Comment:      Above Desirable:  130-159 mg/dl  Borderline high:  160-189 mg/dl  High:             190-219 mg/dl  Very high:       >219 mg/dl     Basic metabolic panel   Result Value Ref Range    Sodium 138 133 - 144 mmol/L    Potassium 4.1 3.4 - 5.3 mmol/L    Chloride 104 94 - 109 mmol/L    Carbon Dioxide 28 20 - 32 mmol/L    Anion Gap 6 3 - 14 mmol/L    Glucose 97  70 - 99 mg/dL      Comment:      Fasting specimen    Urea Nitrogen 16 7 - 30 mg/dL    Creatinine 0.87 0.52 - 1.04 mg/dL    GFR Estimate 68 >60 mL/min/[1.73_m2]      Comment:      Non  GFR Calc  Starting 12/18/2018, serum creatinine based estimated GFR (eGFR) will be   calculated using the Chronic Kidney Disease Epidemiology Collaboration   (CKD-EPI) equation.      GFR Estimate If Black 79 >60 mL/min/[1.73_m2]      Comment:       GFR Calc  Starting 12/18/2018, serum creatinine based estimated GFR (eGFR) will be   calculated using the Chronic Kidney Disease Epidemiology Collaboration   (CKD-EPI) equation.      Calcium 10.3 (H) 8.5 - 10.1 mg/dL   Albumin Random Urine Quantitative with Creat Ratio   Result Value Ref Range    Creatinine Urine 66 mg/dL    Albumin Urine mg/L <5 mg/L    Albumin Urine mg/g Cr Unable to calculate due to low value 0 - 25 mg/g Cr       If you have any questions or concerns, please call the clinic at the number listed above.       Sincerely,        Zoya Elizabeth MD/nr

## 2018-12-27 NOTE — RESULT ENCOUNTER NOTE
The results of your recent lipid (cholesterol) profile were abnormal.    Here are the results:  Lab Results       Component                Value               Date                       CHOL                     245                 12/20/2018            Lab Results       Component                Value               Date                       HDL                      79                  12/20/2018            Lab Results       Component                Value               Date                       LDL                      145                 12/20/2018            Lab Results       Component                Value               Date                       TRIG                     105                 12/20/2018            Lab Results       Component                Value               Date                       CHOLHDLRATIO             2.7                 10/16/2015              Desired or goal levels are:  CHOLESTEROL: Desirable is less than 200.   HDL (Good Cholesterol): Desirable is greater than 40 (for men) greater than 50 (for women).  LDL (Bad Cholesterol): Desirable is less than 130 (or less than 100 if you have heart disease or diabetes). Borderline 130-160.  TRIGLYCERIDES: Desirable is less than 150.  Borderline is 150-200.    Please continue the same dose of your cholesterol medication(s)   Your LDL was a little higher than it has been in recent years.  This can happen if you have missed some lovastatin recently.  If you have not been missing your medication and it might be worth rechecking your lipids and possibly changing or adjusting your medication if the LDL remains high.    As you may know, an elevated cholesterol is one factor that increases your risk for heart disease and stroke. You can improve your cholesterol by controlling the amount and type of fat you eat and by increasing your daily activity level.    Here are some ways to improve your nutrition:  Eat less fat (especially butter, Crisco and other  saturated fats)  Buy lean cuts of meat, reduce your portions of red meat or substitute poultry or fish  Use skim milk and low-fat dairy products  Eat no more than 4 egg yolks per week  Avoid fried or fast foods that are high in fat  Eat more fruits and vegetables      Also consider starting or increasing your aerobic activity. Aerobic activity is the best way to improve HDL (good) cholesterol. If this would be new to you, please talk with me first about what activities are safe for you.      Other lab results:  Your urine protein test was normal.    Your calcium level was slightly high, otherwise your metabolic panel is stable.  I recommend returning for repeat calcium check.  He can schedule this as a lab visit.  I will place the order in advance.  Please feel free to contact us with any questions or if you would like more information.    Zoya Elizabeth M.D.

## 2019-03-27 ENCOUNTER — MYC REFILL (OUTPATIENT)
Dept: FAMILY MEDICINE | Facility: CLINIC | Age: 69
End: 2019-03-27

## 2019-03-27 DIAGNOSIS — I10 HYPERTENSION GOAL BP (BLOOD PRESSURE) < 140/90: ICD-10-CM

## 2019-03-27 DIAGNOSIS — E78.5 HYPERLIPIDEMIA LDL GOAL <130: ICD-10-CM

## 2019-04-01 RX ORDER — BISOPROLOL FUMARATE AND HYDROCHLOROTHIAZIDE 2.5; 6.25 MG/1; MG/1
TABLET ORAL
Qty: 90 TABLET | Refills: 3 | OUTPATIENT
Start: 2019-04-01

## 2019-04-01 RX ORDER — LOVASTATIN 20 MG
20 TABLET ORAL AT BEDTIME
Qty: 90 TABLET | Refills: 3 | OUTPATIENT
Start: 2019-04-01

## 2019-04-01 NOTE — TELEPHONE ENCOUNTER
"Last Written Prescription Date:  12/20/18  Last Fill Quantity: 90,  # refills: 3   Last office visit: 12/20/2018 with prescribing provider:     Future Office Visit:    Requested Prescriptions   Pending Prescriptions Disp Refills     bisoprolol-hydrochlorothiazide (ZIAC) 2.5-6.25 MG tablet 90 tablet 3     Sig: TAKE 1 TABLET EVERY DAY    Beta-Blockers Protocol Failed - 4/1/2019  5:44 AM       Failed - Blood pressure under 140/90 in past 12 months    BP Readings from Last 3 Encounters:   12/20/18 143/84   10/26/17 129/80   10/25/16 122/86                Passed - Patient is age 6 or older       Passed - Recent (12 mo) or future (30 days) visit within the authorizing provider's specialty    Patient had office visit in the last 12 months or has a visit in the next 30 days with authorizing provider or within the authorizing provider's specialty.  See \"Patient Info\" tab in in[facility] Records Managementsket, or \"Choose Columns\" in Meds & Orders section of the refill encounter.             Passed - Medication is active on med list   Last Written Prescription Date:  12/20/18  Last Fill Quantity: 90,  # refills: 3   Last office visit: 12/20/2018 with prescribing provider:     Future Office Visit:           lovastatin (MEVACOR) 20 MG tablet 90 tablet 3     Sig: Take 1 tablet (20 mg) by mouth At Bedtime    Statins Protocol Passed - 4/1/2019  5:44 AM       Passed - LDL on file in past 12 months    Recent Labs   Lab Test 12/20/18  1025   *            Passed - No abnormal creatine kinase in past 12 months    No lab results found.            Passed - Recent (12 mo) or future (30 days) visit within the authorizing provider's specialty    Patient had office visit in the last 12 months or has a visit in the next 30 days with authorizing provider or within the authorizing provider's specialty.  See \"Patient Info\" tab in inbasket, or \"Choose Columns\" in Meds & Orders section of the refill encounter.             Passed - Medication is active on med list    "    Passed - Patient is age 18 or older       Passed - No active pregnancy on record       Passed - No positive pregnancy test in past 12 months

## 2019-10-25 ENCOUNTER — TRANSFERRED RECORDS (OUTPATIENT)
Dept: HEALTH INFORMATION MANAGEMENT | Facility: CLINIC | Age: 69
End: 2019-10-25

## 2019-10-25 LAB — HEMOCCULT STL QL IA: NORMAL

## 2019-12-18 NOTE — PATIENT INSTRUCTIONS
Patient Education   Personalized Prevention Plan  You are due for the preventive services outlined below.  Your care team is available to assist you in scheduling these services.  If you have already completed any of these items, please share that information with your care team to update in your medical record.  Health Maintenance Due   Topic Date Due     Zoster (Shingles) Vaccine (2 of 3) 09/29/2014     Discuss Advance Care Planning  07/30/2017     PHQ-2  01/01/2019     Basic Metabolic Panel  06/20/2019     Annual Wellness Visit  12/20/2019     Cholesterol Lab  12/20/2019     FALL RISK ASSESSMENT  12/20/2019       I recommend getting the new shingles vaccine, Shingrix.  You can call your insurance company to find out if this vaccine is covered.  You may also ask our pharmacy to check if your insurance covers Shingrix if given at the pharmacy.     Call to schedule your bone density test.     Check your blood pressure once daily at different times of the day and write down your results. Send your results via PingThingst or bring to your next visit. Goal blood pressure is less than 140/90. If your blood pressure is higher than this, we will need to discuss a change in treatment.

## 2019-12-18 NOTE — PROGRESS NOTES
"SUBJECTIVE:   Serena Reno is a 69 year old female who presents for Preventive Visit.  Are you in the first 12 months of your Medicare coverage?  No    Healthy Habits:     In general, how would you rate your overall health?  Good    Frequency of exercise:  6-7 days/week    Duration of exercise:  30-45 minutes    Do you usually eat at least 4 servings of fruit and vegetables a day, include whole grains    & fiber and avoid regularly eating high fat or \"junk\" foods?  Yes    Taking medications regularly:  Yes    Medication side effects:  None    Ability to successfully perform activities of daily living:  No assistance needed    Home Safety:  No safety concerns identified    Hearing Impairment:  No hearing concerns    In the past 6 months, have you been bothered by leaking of urine?  No    In general, how would you rate your overall mental or emotional health?  Good      PHQ-2 Total Score: 0    Additional concerns today:  No    Do you feel safe in your environment? Yes    Have you ever done Advance Care Planning? (For example, a Health Directive, POLST, or a discussion with a medical provider or your loved ones about your wishes): Yes, advance care planning is on file.    Fall risk  Fallen 2 or more times in the past year?: No  Any fall with injury in the past year?: No    Cognitive Screening   1) Repeat 3 items (Leader, Season, Table)    2) Clock draw: NORMAL  3) 3 item recall: Recalls 3 objects  Results: 3 items recalled: COGNITIVE IMPAIRMENT LESS LIKELY    Mini-CogTM Copyright MANDI So. Licensed by the author for use in [facility]; reprinted with permission (margie@.[facility]). All rights reserved.      Do you have sleep apnea, excessive snoring or daytime drowsiness?: no    Reviewed and updated as needed this visit by clinical staff  Tobacco  Allergies  Meds         Reviewed and updated as needed this visit by Provider        Social History     Tobacco Use     Smoking status: Former Smoker     " Packs/day: 0.50     Years: 0.00     Pack years: 0.00     Types: Cigarettes     Start date: 1970     Last attempt to quit: 1977     Years since quittin.3     Smokeless tobacco: Never Used   Substance Use Topics     Alcohol use: Yes     Comment: 5-7 glasses wine per week     If you drink alcohol do you typically have >3 drinks per day or >7 drinks per week? No    Alcohol Use 2019   Prescreen: >3 drinks/day or >7 drinks/week? No   Prescreen: >3 drinks/day or >7 drinks/week? -   No flowsheet data found.    Current providers sharing in care for this patient include:    Patient Care Team:  Zoya Elizabeth MD as PCP - General (Family Practice)  Zoya Elizabeth MD as Assigned PCP    The following health maintenance items are reviewed in Epic and correct as of today:  Health Maintenance   Topic Date Due     ZOSTER IMMUNIZATION (2 of 3) 2014     ADVANCE CARE PLANNING  2017     PHQ-2  2019     BMP  2019     LIPID  2019     FALL RISK ASSESSMENT  2019     FIT  10/25/2020     MAMMO SCREENING  2020     MEDICARE ANNUAL WELLNESS VISIT  2020     DTAP/TDAP/TD IMMUNIZATION (2 - Td) 2028     DEXA  Completed     HEPATITIS C SCREENING  Completed     INFLUENZA VACCINE  Completed     PNEUMOCOCCAL IMMUNIZATION 65+ LOW/MEDIUM RISK  Completed     IPV IMMUNIZATION  Aged Out     MENINGITIS IMMUNIZATION  Aged Out     BP Readings from Last 3 Encounters:   19 (!) 150/86   18 143/84   10/26/17 129/80    Wt Readings from Last 3 Encounters:   19 88.8 kg (195 lb 12 oz)   18 94.3 kg (208 lb)   10/26/17 92.5 kg (204 lb)                  Patient Active Problem List   Diagnosis     Hypertension goal BP (blood pressure) < 140/90     Hyperlipidemia with target LDL less than 130     Advanced directives, counseling/discussion     Past Surgical History:   Procedure Laterality Date     COLONOSCOPY       DILATION AND CURETTAGE      after missed AB       Social  History     Tobacco Use     Smoking status: Former Smoker     Packs/day: 0.50     Years: 0.00     Pack years: 0.00     Types: Cigarettes     Start date: 1970     Last attempt to quit: 1977     Years since quittin.3     Smokeless tobacco: Never Used   Substance Use Topics     Alcohol use: Yes     Comment: 5-7 glasses wine per week     Family History   Problem Relation Age of Onset     Cerebrovascular Disease Mother         Complication of heart valve replacement     Other Cancer Mother         Uterine     Osteoporosis Mother      Coronary Artery Disease Father      Hypertension Father      Hyperlipidemia Father      Obesity Father      Hypertension Sister      Hyperlipidemia Sister      Thyroid Disease Sister      Obesity Sister      Hypertension Sister      Obesity Sister      Hypertension Brother      Hyperlipidemia Brother      Thyroid Disease Daughter      Thyroid Disease Daughter          Current Outpatient Medications   Medication Sig Dispense Refill     Ascorbic Acid (VITAMIN C) 500 MG CAPS Take 1 capsule by mouth daily.       aspirin (ASA) 81 MG tablet Take 1 tablet by mouth daily.       bisoprolol-hydrochlorothiazide (ZIAC) 2.5-6.25 MG tablet TAKE 1 TABLET EVERY DAY 90 tablet 3     co-enzyme Q-10 (CO Q 10) 100 MG CAPS Take 1 capsule by mouth daily.       Glucosamine-Chondroit-Vit C-Mn (GLUCOSAMINE CHONDR 1500 COMPLX PO) Take 2 capsules by mouth daily        lovastatin (MEVACOR) 20 MG tablet Take 1 tablet (20 mg) by mouth At Bedtime 90 tablet 3     Multiple Vitamin (MULTIVITAMINS PO) Take 1 capsule by mouth daily. Adult 50 plus       Multiple Vitamins-Minerals (PRESERVISION AREDS 2) CAPS Take 2 capsules by mouth daily.       omega-3 fatty acids (FISH OIL) 1200 MG capsule Take 1 capsule by mouth daily.       vitamin E 400 UNIT capsule Take 1 capsule by mouth daily.       No Known Allergies  Recent Labs   Lab Test 18  1025 10/26/17  1021 16  0929 10/16/15  0859 14  0850  "08/01/13  0927 07/30/12  0931   * 98 95 89 112 94 90   HDL 79 99 62 63 84 73 80   TRIG 105 75 79 80 72 73 66   ALT  --   --   --  44 22 26 13   CR 0.87 0.86 0.92 0.83 0.89 0.86 0.78   GFRESTIMATED 68 66 61 69 64 67 75   GFRESTBLACK 79 79 74 83 77 81 >90   POTASSIUM 4.1 3.9 3.8 3.7 3.8 4.2 3.9   TSH  --   --   --   --   --  1.47 1.13          Review of Systems   Constitutional: Negative for chills and fever.   HENT: Negative for congestion, ear pain, hearing loss and sore throat.    Eyes: Negative for pain and visual disturbance.   Respiratory: Negative for cough and shortness of breath.    Cardiovascular: Negative for chest pain, palpitations and peripheral edema.   Gastrointestinal: Negative for abdominal pain, constipation, diarrhea, heartburn, hematochezia and nausea.   Breasts:  Negative for tenderness, breast mass and discharge.   Genitourinary: Negative for dysuria, frequency, genital sores, hematuria, pelvic pain, urgency, vaginal bleeding and vaginal discharge.   Musculoskeletal: Negative for arthralgias, joint swelling and myalgias.   Skin: Negative for rash.   Neurological: Negative for dizziness, weakness, headaches and paresthesias.   Psychiatric/Behavioral: Negative for mood changes. The patient is not nervous/anxious.        OBJECTIVE:   BP (!) 150/86 (BP Location: Left arm, Patient Position: Sitting, Cuff Size: Adult Regular)   Pulse 60   Temp 97.8  F (36.6  C) (Oral)   Ht 1.656 m (5' 5.2\")   Wt 88.8 kg (195 lb 12 oz)   SpO2 98%   BMI 32.37 kg/m   Estimated body mass index is 32.37 kg/m  as calculated from the following:    Height as of this encounter: 1.656 m (5' 5.2\").    Weight as of this encounter: 88.8 kg (195 lb 12 oz).  Physical Exam  GENERAL APPEARANCE: healthy, alert and no distress  EYES: Eyes grossly normal to inspection, PERRL and conjunctivae and sclerae normal  HENT: ear canals and TM's normal, nose and mouth without ulcers or lesions, oropharynx clear and oral mucous " membranes moist  NECK: no adenopathy, no asymmetry, masses, or scars and thyroid normal to palpation  RESP: lungs clear to auscultation - no rales, rhonchi or wheezes  CV: regular rate and rhythm, normal S1 S2, no S3 or S4, no murmur, click or rub, no peripheral edema and peripheral pulses strong  ABDOMEN: soft, nontender, no hepatosplenomegaly, no masses and bowel sounds normal  MS: no musculoskeletal defects are noted and gait is age appropriate without ataxia  SKIN: no suspicious lesions or rashes  NEURO: Normal strength and tone, sensory exam grossly normal, mentation intact and speech normal  PSYCH: mentation appears normal and affect normal/bright    Diagnostic Test Results:  Labs reviewed in Epic    ASSESSMENT / PLAN:       ICD-10-CM    1. Encounter for Medicare annual wellness exam Z00.00    2. Hypertension goal BP (blood pressure) < 140/90 I10 bisoprolol-hydrochlorothiazide (ZIAC) 2.5-6.25 MG tablet     Comprehensive metabolic panel     Albumin Random Urine Quantitative with Creat Ratio   3. Hyperlipidemia LDL goal <130 E78.5 Lipid panel reflex to direct LDL Fasting     lovastatin (MEVACOR) 20 MG tablet     Comprehensive metabolic panel   4. Cold sore B00.1    5. Post-menopausal Z78.0 DX Hip/Pelvis/Spine     1. Encounter for routine adult health examination without abnormal findings  mammo is scheduled for today  She completed her FIT test in October and it was normal.   Shingrix vaccine was recommended  I have recommended scheduling a bone density test  She completed her influenza vaccine this fall     2. Hypertension goal BP (blood pressure) < 140/90  Initially slightly elevated, norma recheck with MA.  I recommended checking her blood pressure at home daily for the next week or 2 and send me my chart message with results.  If above goal, would increase her medication to 2 tablets daily.  Continues bisoprolol--hydrochlorothiazide 2.5-6.25 mg daily. Labs today for monitoring as well.   -  "bisoprolol-hydrochlorothiazide (ZIAC) 2.5-6.25 MG tablet; TAKE 1 TABLET EVERY DAY  Dispense: 90 tablet; Refill: 3  -CMP  - Albumin Random Urine Quantitative with Creat Ratio     3. Hyperlipidemia LDL goal <130  Controlled.   Continues lovastatin 20 mg daily.  CMP and lipids today for medication monitoring  - lovastatin (MEVACOR) 20 MG tablet; Take 1 tablet (20 mg) by mouth At Bedtime  Dispense: 90 tablet; Refill: 3  - Lipid panel reflex to direct LDL Fasting     4. Colon cancer screening  She completed her fit test in October 5. Postmenopausal   I recommended scheduling a DEXA scan  - DX Hip/Pelvis/Spine; Future     6. Cold sore   Occasional cold sore.  She had previously used acyclovir ointment, however due to the expense she prefers not to use medication.  She typically gets about 1 cold sore per year.     End of Life Planning:  Patient currently has an advanced directive: Yes.  Practitioner is supportive of decision.  COUNSELING:  Reviewed preventive health counseling, as reflected in patient instructions    Estimated body mass index is 32.37 kg/m  as calculated from the following:    Height as of this encounter: 1.656 m (5' 5.2\").    Weight as of this encounter: 88.8 kg (195 lb 12 oz).    Weight management plan: Discussed healthy diet and exercise guidelines     reports that she quit smoking about 42 years ago. Her smoking use included cigarettes. She started smoking about 49 years ago. She smoked 0.50 packs per day for 0.00 years. She has never used smokeless tobacco.      Appropriate preventive services were discussed with this patient, including applicable screening as appropriate for cardiovascular disease, diabetes, osteopenia/osteoporosis, and glaucoma.  As appropriate for age/gender, discussed screening for colorectal cancer, prostate cancer, breast cancer, and cervical cancer. Checklist reviewing preventive services available has been given to the patient.    Reviewed patients plan of care and " provided an AVS. The Intermediate Care Plan ( asthma action plan, low back pain action plan, and migraine action plan) for Serena meets the Care Plan requirement. This Care Plan has been established and reviewed with the Patient.    Counseling Resources:  ATP IV Guidelines  Pooled Cohorts Equation Calculator  Breast Cancer Risk Calculator  FRAX Risk Assessment  ICSI Preventive Guidelines  Dietary Guidelines for Americans, 2010  [employer]'s MyPlate  ASA Prophylaxis  Lung CA Screening    Zoya Elizabeth MD, MD  [facility]    Identified Health Risks:

## 2019-12-19 ENCOUNTER — ANCILLARY PROCEDURE (OUTPATIENT)
Dept: MAMMOGRAPHY | Facility: CLINIC | Age: 69
End: 2019-12-19
Attending: FAMILY MEDICINE
Payer: COMMERCIAL

## 2019-12-19 ENCOUNTER — OFFICE VISIT (OUTPATIENT)
Dept: FAMILY MEDICINE | Facility: CLINIC | Age: 69
End: 2019-12-19
Payer: COMMERCIAL

## 2019-12-19 VITALS
OXYGEN SATURATION: 98 % | DIASTOLIC BLOOD PRESSURE: 86 MMHG | TEMPERATURE: 97.8 F | HEIGHT: 65 IN | BODY MASS INDEX: 32.61 KG/M2 | WEIGHT: 195.75 LBS | SYSTOLIC BLOOD PRESSURE: 142 MMHG | HEART RATE: 60 BPM

## 2019-12-19 DIAGNOSIS — B00.1 COLD SORE: ICD-10-CM

## 2019-12-19 DIAGNOSIS — I10 HYPERTENSION GOAL BP (BLOOD PRESSURE) < 140/90: ICD-10-CM

## 2019-12-19 DIAGNOSIS — Z12.31 VISIT FOR SCREENING MAMMOGRAM: ICD-10-CM

## 2019-12-19 DIAGNOSIS — Z78.0 POST-MENOPAUSAL: ICD-10-CM

## 2019-12-19 DIAGNOSIS — E78.5 HYPERLIPIDEMIA LDL GOAL <130: ICD-10-CM

## 2019-12-19 DIAGNOSIS — Z00.00 ENCOUNTER FOR MEDICARE ANNUAL WELLNESS EXAM: Primary | ICD-10-CM

## 2019-12-19 LAB
ALBUMIN SERPL-MCNC: 4.2 G/DL (ref 3.4–5)
ALP SERPL-CCNC: 41 U/L (ref 40–150)
ALT SERPL W P-5'-P-CCNC: 25 U/L (ref 0–50)
ANION GAP SERPL CALCULATED.3IONS-SCNC: 7 MMOL/L (ref 3–14)
AST SERPL W P-5'-P-CCNC: 16 U/L (ref 0–45)
BILIRUB SERPL-MCNC: 0.5 MG/DL (ref 0.2–1.3)
BUN SERPL-MCNC: 17 MG/DL (ref 7–30)
CALCIUM SERPL-MCNC: 9.7 MG/DL (ref 8.5–10.1)
CHLORIDE SERPL-SCNC: 107 MMOL/L (ref 94–109)
CHOLEST SERPL-MCNC: 191 MG/DL
CO2 SERPL-SCNC: 26 MMOL/L (ref 20–32)
CREAT SERPL-MCNC: 0.88 MG/DL (ref 0.52–1.04)
GFR SERPL CREATININE-BSD FRML MDRD: 66 ML/MIN/{1.73_M2}
GLUCOSE SERPL-MCNC: 95 MG/DL (ref 70–99)
HDLC SERPL-MCNC: 74 MG/DL
LDLC SERPL CALC-MCNC: 101 MG/DL
NONHDLC SERPL-MCNC: 117 MG/DL
POTASSIUM SERPL-SCNC: 4 MMOL/L (ref 3.4–5.3)
PROT SERPL-MCNC: 7.2 G/DL (ref 6.8–8.8)
SODIUM SERPL-SCNC: 140 MMOL/L (ref 133–144)
TRIGL SERPL-MCNC: 81 MG/DL

## 2019-12-19 PROCEDURE — 36415 COLL VENOUS BLD VENIPUNCTURE: CPT | Performed by: FAMILY MEDICINE

## 2019-12-19 PROCEDURE — 80061 LIPID PANEL: CPT | Performed by: FAMILY MEDICINE

## 2019-12-19 PROCEDURE — G0439 PPPS, SUBSEQ VISIT: HCPCS | Performed by: FAMILY MEDICINE

## 2019-12-19 PROCEDURE — 82043 UR ALBUMIN QUANTITATIVE: CPT | Performed by: FAMILY MEDICINE

## 2019-12-19 PROCEDURE — 77063 BREAST TOMOSYNTHESIS BI: CPT | Mod: TC

## 2019-12-19 PROCEDURE — 80053 COMPREHEN METABOLIC PANEL: CPT | Performed by: FAMILY MEDICINE

## 2019-12-19 PROCEDURE — 99213 OFFICE O/P EST LOW 20 MIN: CPT | Mod: 25 | Performed by: FAMILY MEDICINE

## 2019-12-19 PROCEDURE — 77067 SCR MAMMO BI INCL CAD: CPT | Mod: TC

## 2019-12-19 RX ORDER — BISOPROLOL FUMARATE AND HYDROCHLOROTHIAZIDE 2.5; 6.25 MG/1; MG/1
TABLET ORAL
Qty: 90 TABLET | Refills: 3 | Status: SHIPPED | OUTPATIENT
Start: 2019-12-19 | End: 2020-07-07

## 2019-12-19 RX ORDER — LOVASTATIN 20 MG
20 TABLET ORAL AT BEDTIME
Qty: 90 TABLET | Refills: 3 | Status: SHIPPED | OUTPATIENT
Start: 2019-12-19 | End: 2020-07-07

## 2019-12-19 RX ORDER — ACYCLOVIR 50 MG/G
OINTMENT TOPICAL
Qty: 15 G | Refills: 1 | Status: CANCELLED | OUTPATIENT
Start: 2019-12-19

## 2019-12-19 ASSESSMENT — ENCOUNTER SYMPTOMS
JOINT SWELLING: 0
WEAKNESS: 0
PARESTHESIAS: 0
ABDOMINAL PAIN: 0
PALPITATIONS: 0
CONSTIPATION: 0
FREQUENCY: 0
DYSURIA: 0
SHORTNESS OF BREATH: 0
NERVOUS/ANXIOUS: 0
MYALGIAS: 0
EYE PAIN: 0
HEMATOCHEZIA: 0
CHILLS: 0
HEMATURIA: 0
BREAST MASS: 0
FEVER: 0
DIARRHEA: 0
HEADACHES: 0
DIZZINESS: 0
NAUSEA: 0
SORE THROAT: 0
COUGH: 0
HEARTBURN: 0
ARTHRALGIAS: 0

## 2019-12-19 ASSESSMENT — MIFFLIN-ST. JEOR: SCORE: 1416.97

## 2019-12-19 ASSESSMENT — ACTIVITIES OF DAILY LIVING (ADL): CURRENT_FUNCTION: NO ASSISTANCE NEEDED

## 2019-12-20 LAB
CREAT UR-MCNC: 75 MG/DL
MICROALBUMIN UR-MCNC: <5 MG/L
MICROALBUMIN/CREAT UR: NORMAL MG/G CR (ref 0–25)

## 2019-12-23 ENCOUNTER — MYC MEDICAL ADVICE (OUTPATIENT)
Dept: FAMILY MEDICINE | Facility: CLINIC | Age: 69
End: 2019-12-23

## 2019-12-23 ENCOUNTER — HOSPITAL ENCOUNTER (OUTPATIENT)
Dept: BONE DENSITY | Facility: CLINIC | Age: 69
Discharge: HOME | End: 2019-12-23
Attending: FAMILY MEDICINE | Admitting: FAMILY MEDICINE
Payer: COMMERCIAL

## 2019-12-23 DIAGNOSIS — Z78.0 POST-MENOPAUSAL: ICD-10-CM

## 2019-12-23 DIAGNOSIS — R92.8 ABNORMAL MAMMOGRAM: Primary | ICD-10-CM

## 2019-12-23 PROCEDURE — 77080 DXA BONE DENSITY AXIAL: CPT

## 2019-12-24 NOTE — TELEPHONE ENCOUNTER
Yes okay to inform of abnormal results, more imaging needed.  I am surprised the breast center has not notified her of this as usually they do. I did order follow up ultrasound, we can see if TC can schedule her.    Heena Sanders, CNP

## 2019-12-24 NOTE — TELEPHONE ENCOUNTER
"Dr. Elizabeth/Covering providers-Please review.  Based on 12/19/19 mammogram screening report:  1. Ultrasound recommended to further evaluate right breast \"tiny nodular asymmetry\"  2. May patient be informed right breast had abnormality that requires further imaging follow up?   A. US order is in scheduling queue for patient's appointment desk    Thank you!  CELIA BoatengN, RN    "

## 2019-12-24 NOTE — TELEPHONE ENCOUNTER
Dr. Elizabeth-Please review patient's messages.  Unclear to writer why results were not communicated to patient by the [facility], which is writer's understanding of this particular process.    Thank you!  CELIA BoatengN, RN

## 2019-12-26 ENCOUNTER — HOSPITAL ENCOUNTER (OUTPATIENT)
Dept: MAMMOGRAPHY | Facility: CLINIC | Age: 69
Discharge: HOME | End: 2019-12-26
Attending: FAMILY MEDICINE | Admitting: FAMILY MEDICINE
Payer: COMMERCIAL

## 2019-12-26 DIAGNOSIS — R92.8 ABNORMAL MAMMOGRAM: ICD-10-CM

## 2019-12-26 PROCEDURE — 76642 ULTRASOUND BREAST LIMITED: CPT | Mod: RT

## 2019-12-26 NOTE — TELEPHONE ENCOUNTER
Please let Serena know that I am sorry she did not receive her results as she should have. The standard is that she receive results and recommendation for follow up from the facility where mammography was done and I am not sure why that did not happen.  Zoya Elizabeth M.D.

## 2020-03-11 ENCOUNTER — OFFICE VISIT (OUTPATIENT)
Dept: FAMILY MEDICINE | Facility: CLINIC | Age: 70
End: 2020-03-11
Payer: COMMERCIAL

## 2020-03-11 VITALS
HEART RATE: 65 BPM | WEIGHT: 202.25 LBS | TEMPERATURE: 98 F | BODY MASS INDEX: 33.45 KG/M2 | DIASTOLIC BLOOD PRESSURE: 82 MMHG | OXYGEN SATURATION: 99 % | SYSTOLIC BLOOD PRESSURE: 130 MMHG

## 2020-03-11 DIAGNOSIS — Z01.818 PREOP GENERAL PHYSICAL EXAM: Primary | ICD-10-CM

## 2020-03-11 DIAGNOSIS — H26.9 CATARACT OF BOTH EYES, UNSPECIFIED CATARACT TYPE: ICD-10-CM

## 2020-03-11 DIAGNOSIS — I10 HYPERTENSION GOAL BP (BLOOD PRESSURE) < 140/90: ICD-10-CM

## 2020-03-11 DIAGNOSIS — E78.5 HYPERLIPIDEMIA WITH TARGET LDL LESS THAN 130: ICD-10-CM

## 2020-03-11 PROCEDURE — 99214 OFFICE O/P EST MOD 30 MIN: CPT | Performed by: FAMILY MEDICINE

## 2020-03-11 NOTE — PATIENT INSTRUCTIONS
Patient Education   Personalized Prevention Plan  You are due for the preventive services outlined below.  Your care team is available to assist you in scheduling these services.  If you have already completed any of these items, please share that information with your care team to update in your medical record.  Health Maintenance Due   Topic Date Due     Zoster (Shingles) Vaccine (2 of 3) 09/29/2014     PHQ-2  01/01/2020        Before Your Surgery      Call your surgeon if there is any change in your health. This includes signs of a cold or flu (such as a sore throat, runny nose, cough, rash or fever).    Do not smoke, drink alcohol or take over the counter medicine (unless your surgeon or primary care doctor tells you to) for the 24 hours before and after surgery.    If you take prescribed drugs: Follow your doctor s orders about which medicines to take and which to stop until after surgery.    Eating and drinking prior to surgery: follow the instructions from your surgeon    Take a shower or bath the night before surgery. Use the soap your surgeon gave you to gently clean your skin. If you do not have soap from your surgeon, use your regular soap. Do not shave or scrub the surgery site.  Wear clean pajamas and have clean sheets on your bed.

## 2020-03-11 NOTE — PROGRESS NOTES
[facility]  [address] [place] [address] [id number] [phone]  Dept: [phone]    PRE-OP EVALUATION:  Today's date: 3/11/2020    Serena Reno (: 1950) presents for pre-operative evaluation assessment as requested by Dr. Raul Cook MD .  She requires evaluation and anesthesia risk assessment prior to undergoing surgery/procedure for treatment of Cataract surgery left and right eye .    Fax number for surgical facility: [phone]- [facility] [place]   Primary Physician: Zoya Elizabeth  Type of Anesthesia Anticipated: local with sedation     Patient has a Health Care Directive or Living Will:  YES she brought it with today     Preop Questions 3/11/2020   Who is doing your surgery? Dr Raul Crooks   What are you having done? left cataract extraction with intraocular lens   Date of Surgery/Procedure: 3/19/2020   Facility or Hospital where procedure/surgery will be performed: [facility]   1.  Do you have a history of Heart attack, stroke, stent, coronary bypass surgery, or other heart surgery? No   2.  Do you ever have any pain or discomfort in your chest? No   3.  Do you have a history of  Heart Failure? No   4.   Are you troubled by shortness of breath when:  walking on a level surface, or up a slight hill, or at night? No   5.  Do you currently have a cold, bronchitis or other respiratory infection? No   6.  Do you have a cough, shortness of breath, or wheezing? No   7.  Do you sometimes get pains in the calves of your legs when you walk? No   8. Do you or anyone in your family have previous history of blood clots? No   9.  Do you or does anyone in your family have a serious bleeding problem such as prolonged bleeding following surgeries or cuts? No   10. Have you ever had problems with anemia or been told to take iron pills? No   11. Have you had any abnormal blood loss such as black, tarry or bloody stools, or abnormal  vaginal bleeding? No   12. Have you ever had a blood transfusion? No   13. Have you or any of your relatives ever had problems with anesthesia? No   14. Do you have sleep apnea, excessive snoring or daytime drowsiness? No   15. Do you have any prosthetic heart valves? No   16. Do you have prosthetic joints? No   17. Is there any chance that you may be pregnant? No         HPI:     HPI related to upcoming procedure: Serena Reno is a 69 year old female who presents today for preop for cataract procedure      HYPERLIPIDEMIA - Patient has a  history of significant Hyperlipidemia requiring medication for treatment with recent good control. Patient reports no problems or side effects with the medication.     HYPERTENSION - Patient has  history of HTN , currently denies any symptoms referable to elevated blood pressure.  Current medication regimen is as listed below. Patient denies any side effects of medication.       MEDICAL HISTORY:     Patient Active Problem List    Diagnosis Date Noted     Advanced directives, counseling/discussion 07/30/2012     Priority: Medium     Hypertension goal BP (blood pressure) < 140/90      Priority: Medium     Hyperlipidemia with target LDL less than 130      Priority: Medium     Diagnosis updated by automated process. Provider to review and confirm.        Past Medical History:   Diagnosis Date     Borderline abnormal thyroid function test 7/30/2012     Hyperlipidemia LDL goal < 130      Hypertension goal BP (blood pressure) < 140/90      Past Surgical History:   Procedure Laterality Date     COLONOSCOPY  2009     DILATION AND CURETTAGE      after missed AB     Current Outpatient Medications   Medication Sig Dispense Refill     Ascorbic Acid (VITAMIN C) 500 MG CAPS Take 1 capsule by mouth daily.       bisoprolol-hydrochlorothiazide (ZIAC) 2.5-6.25 MG tablet TAKE 1 TABLET EVERY DAY 90 tablet 3     co-enzyme Q-10 (CO Q 10) 100 MG CAPS Take 1 capsule by mouth daily.        Glucosamine-Chondroit-Vit C-Mn (GLUCOSAMINE CHONDR 1500 COMPLX PO) Take 2 capsules by mouth daily        lovastatin (MEVACOR) 20 MG tablet Take 1 tablet (20 mg) by mouth At Bedtime 90 tablet 3     Multiple Vitamin (MULTIVITAMINS PO) Take 1 capsule by mouth daily. Adult 50 plus       Multiple Vitamins-Minerals (PRESERVISION AREDS 2) CAPS Take 2 capsules by mouth daily.       omega-3 fatty acids (FISH OIL) 1200 MG capsule Take 1 capsule by mouth daily.       vitamin E 400 UNIT capsule Take 1 capsule by mouth daily.       aspirin (ASA) 81 MG tablet Take 1 tablet by mouth daily.       OTC products: None, except as noted above    No Known Allergies   Latex Allergy: NO    Social History     Tobacco Use     Smoking status: Former Smoker     Packs/day: 0.50     Years: 0.00     Pack years: 0.00     Types: Cigarettes     Start date: 1970     Last attempt to quit: 1977     Years since quittin.5     Smokeless tobacco: Never Used   Substance Use Topics     Alcohol use: Yes     Comment: 5-7 glasses wine per week     History   Drug Use No       REVIEW OF SYSTEMS:   Constitutional, neuro, ENT, endocrine, pulmonary, cardiac, gastrointestinal, genitourinary, musculoskeletal, integument and psychiatric systems are negative, except as otherwise noted.    EXAM:   /82 (BP Location: Left arm, Patient Position: Sitting, Cuff Size: Adult Regular)   Pulse 65   Temp 98  F (36.7  C) (Temporal)   Wt 91.7 kg (202 lb 4 oz)   SpO2 99%   BMI 33.45 kg/m      GENERAL APPEARANCE: healthy, alert and no distress     EYES: EOMI, PERRL     HENT: ear canals and TM's normal and nose and mouth without ulcers or lesions     NECK: no adenopathy, no asymmetry, masses, or scars and thyroid normal to palpation     RESP: lungs clear to auscultation - no rales, rhonchi or wheezes     CV: regular rates and rhythm, normal S1 S2, no S3 or S4 and no murmur, click or rub     ABDOMEN:  soft, nontender, no HSM or masses and bowel sounds normal      MS: extremities normal- no gross deformities noted      SKIN: no suspicious lesions or rashes     NEURO: mentation intact and speech normal     PSYCH: mentation appears normal. and affect normal/bright     LYMPHATICS: No cervical adenopathy    DIAGNOSTICS:   No labs or EKG required for low risk surgery (cataract, skin procedure, breast biopsy, etc)    Recent Labs   Lab Test 12/19/19  0959 12/20/18  1025    138   POTASSIUM 4.0 4.1   CR 0.88 0.87        IMPRESSION:   Reason for surgery/procedure: cataract  Diagnosis/reason for consult: preop for cataract procedure     The proposed surgical procedure is considered LOW risk.    REVISED CARDIAC RISK INDEX  The patient has the following serious cardiovascular risks for perioperative complications such as (MI, PE, VFib and 3  AV Block):  No serious cardiac risks  INTERPRETATION: 0 risks: Class I (very low risk - 0.4% complication rate)    The patient has the following additional risks for perioperative complications:  No identified additional risks      ICD-10-CM    1. Preop general physical exam  Z01.818    2. Cataract of both eyes, unspecified cataract type  H26.9    3. Hyperlipidemia with target LDL less than 130  E78.5    4. Hypertension goal BP (blood pressure) < 140/90  I10      2. Cataracts, bilatearl         3. Hypertension goal BP (blood pressure) < 140/90  Controlled.   Continues bisoprolol--hydrochlorothiazide 2.5-6.25 mg daily.       4. Hyperlipidemia LDL goal <130  Controlled.   Continues lovastatin 20 mg daily.         RECOMMENDATIONS:         --Patient is to take all scheduled medications on the day of surgery EXCEPT for modifications listed below.    APPROVAL GIVEN to proceed with proposed procedure, without further diagnostic evaluation       Signed Electronically by: Zoya Elizabeth MD    Copy of this evaluation report is provided to requesting physician.    [place] Preop Guidelines    Revised Cardiac Risk Index

## 2020-03-11 NOTE — PROGRESS NOTES
"SUBJECTIVE:   Serena Reno is a 69 year old female who presents for Preventive Visit.    Are you in the first 12 months of your Medicare coverage?  { :186048::\"No\"}    HPI  Do you feel safe in your environment? { :152139}    Have you ever done Advance Care Planning? (For example, a Health Directive, POLST, or a discussion with a medical provider or your loved ones about your wishes): { :797461}    Fall risk  { :877341}    Cognitive Screening   1) Repeat 3 items (Leader, Season, Table)    2) Clock draw: { :80729::\"NORMAL\"}  3) 3 item recall: { :069752}  Results: {MINICOG RESULTS:874962}    Mini-CogTM Copyright MANDI So. Licensed by the author for use in [employer]; reprinted with permission (margie@[facility]). All rights reserved.      {Do you have sleep apnea, excessive snoring or daytime drowsiness? (Optional):396470}    Reviewed and updated as needed this visit by clinical staff         Reviewed and updated as needed this visit by Provider        Social History     Tobacco Use     Smoking status: Former Smoker     Packs/day: 0.50     Years: 0.00     Pack years: 0.00     Types: Cigarettes     Start date: 1970     Last attempt to quit: 1977     Years since quittin.5     Smokeless tobacco: Never Used   Substance Use Topics     Alcohol use: Yes     Comment: 5-7 glasses wine per week     {Rooming Staff- Complete this question if Prescreen response is not shown below for today's visit. If you drink alcohol do you typically have >3 drinks per day or >7 drinks per week? (Optional):242676}    Alcohol Use 2019   Prescreen: >3 drinks/day or >7 drinks/week? No   Prescreen: >3 drinks/day or >7 drinks/week? -   {add AUDIT responses (Optional) (A score of 7 for adult men is an indication of hazardous drinking; a score of 8 or more is an indication of an alcohol use disorder.  A score of 7 or more for adult women is an indication of hazardous drinking or an alchohol use disorder):057071}      Current " "providers sharing in care for this patient include: {Rooming staff:  Please update Care Team in Rooming Activity, refresh this note and then delete this statement}  Patient Care Team:  Zoya Elizabeth MD as PCP - General (Family Practice)  Zoya Elizabeth MD as Assigned PCP    The following health maintenance items are reviewed in Epic and correct as of today:  Health Maintenance   Topic Date Due     ZOSTER IMMUNIZATION (2 of 3) 2014     PHQ-2  2020     BMP  2020     COLORECTAL CANCER SCREENING  10/25/2020     MEDICARE ANNUAL WELLNESS VISIT  2020     LIPID  2020     FALL RISK ASSESSMENT  2020     MAMMO SCREENING  2021     ADVANCE CARE PLANNING  2024     DTAP/TDAP/TD IMMUNIZATION (2 - Td) 2028     DEXA  Completed     HEPATITIS C SCREENING  Completed     INFLUENZA VACCINE  Completed     PNEUMOCOCCAL IMMUNIZATION 65+ LOW/MEDIUM RISK  Completed     IPV IMMUNIZATION  Aged Out     MENINGITIS IMMUNIZATION  Aged Out     {Chronicprobdata (optional):572139}  {Decision Support (Optional):477742}    Review of Systems  {ROS COMP (Optional):286835}    OBJECTIVE:   There were no vitals taken for this visit. Estimated body mass index is 32.37 kg/m  as calculated from the following:    Height as of 19: 1.656 m (5' 5.2\").    Weight as of 19: 88.8 kg (195 lb 12 oz).  Physical Exam  {Exam (Optional) :136480}    {Diagnostic Test Results (Optional):711193::\"Diagnostic Test Results:\",\"Labs reviewed in Epic\"}    ASSESSMENT / PLAN:   {Diag Picklist:325195}    COUNSELING:  {Medicare Counselin}    Estimated body mass index is 32.37 kg/m  as calculated from the following:    Height as of 19: 1.656 m (5' 5.2\").    Weight as of 19: 88.8 kg (195 lb 12 oz).    {Weight Management Plan (ACO) Complete if BMI is abnormal-  Ages 18-64  BMI >24.9.  Age 65+ with BMI <23 or >30 (Optional):355335}     reports that she quit smoking about 42 years ago. Her smoking use " included cigarettes. She started smoking about 49 years ago. She smoked 0.50 packs per day for 0.00 years. She has never used smokeless tobacco.  {Tobacco Cessation -- Complete if patient is a smoker (Optional):084534}    Appropriate preventive services were discussed with this patient, including applicable screening as appropriate for cardiovascular disease, diabetes, osteopenia/osteoporosis, and glaucoma.  As appropriate for age/gender, discussed screening for colorectal cancer, prostate cancer, breast cancer, and cervical cancer. Checklist reviewing preventive services available has been given to the patient.    Reviewed patients plan of care and provided an AVS. The {CarePlan:755414} for Serena meets the Care Plan requirement. This Care Plan has been established and reviewed with the {PATIENT, FAMILY MEMBER, CAREGIVER:208315}.    Counseling Resources:  ATP IV Guidelines  Pooled Cohorts Equation Calculator  Breast Cancer Risk Calculator  FRAX Risk Assessment  ICSI Preventive Guidelines  Dietary Guidelines for Americans, 2010  [employer]'s MyPlate  ASA Prophylaxis  Lung CA Screening    Zoya Elizabeth MD, MD  [facility]    Identified Health Risks:

## 2020-07-07 ENCOUNTER — OFFICE VISIT (OUTPATIENT)
Dept: FAMILY MEDICINE | Facility: CLINIC | Age: 70
End: 2020-07-07
Payer: COMMERCIAL

## 2020-07-07 VITALS
HEART RATE: 63 BPM | RESPIRATION RATE: 18 BRPM | BODY MASS INDEX: 30.73 KG/M2 | TEMPERATURE: 98.3 F | OXYGEN SATURATION: 99 % | DIASTOLIC BLOOD PRESSURE: 82 MMHG | WEIGHT: 185.8 LBS | SYSTOLIC BLOOD PRESSURE: 138 MMHG

## 2020-07-07 DIAGNOSIS — H26.9 CATARACT, UNSPECIFIED CATARACT TYPE, UNSPECIFIED LATERALITY: ICD-10-CM

## 2020-07-07 DIAGNOSIS — Z01.818 PREOP GENERAL PHYSICAL EXAM: Primary | ICD-10-CM

## 2020-07-07 DIAGNOSIS — E78.5 HYPERLIPIDEMIA LDL GOAL <130: ICD-10-CM

## 2020-07-07 DIAGNOSIS — I10 HYPERTENSION GOAL BP (BLOOD PRESSURE) < 140/90: ICD-10-CM

## 2020-07-07 PROCEDURE — 99214 OFFICE O/P EST MOD 30 MIN: CPT | Performed by: FAMILY MEDICINE

## 2020-07-07 RX ORDER — BISOPROLOL FUMARATE AND HYDROCHLOROTHIAZIDE 2.5; 6.25 MG/1; MG/1
TABLET ORAL
Qty: 90 TABLET | Refills: 3 | Status: SHIPPED | OUTPATIENT
Start: 2020-07-07 | End: 2021-07-29

## 2020-07-07 RX ORDER — LOVASTATIN 20 MG
20 TABLET ORAL AT BEDTIME
Qty: 90 TABLET | Refills: 3 | Status: SHIPPED | OUTPATIENT
Start: 2020-07-07 | End: 2021-05-12

## 2020-07-07 NOTE — PROGRESS NOTES
[facility] [address] [place] [address]  [phone]  Dept: [phone]    PRE-OP EVALUATION:  Today's date: 2020    Serena Reno (: 1950) presents for pre-operative evaluation assessment as requested by Dr. Raul Crooks.  She requires evaluation and anesthesia risk assessment prior to undergoing surgery/procedure for treatment of Cataracts with lens implant left side .    Fax number for surgical facility: [phone]- [facility] [place]   Primary Physician: Zoya Elizabeth  Type of Anesthesia Anticipated: local with sedation     Patient has a Health Care Directive or Living Will:  YES     Preop Questions 2020   Who is doing your surgery? Raul Crooks   What are you having done? Cataracts with lens implant   Date of Surgery/Procedure: 2020 and 2020   Facility or Hospital where procedure/surgery will be performed: [facility], [address] [place] [address]   1.  Do you have a history of Heart attack, stroke, stent, coronary bypass surgery, or other heart surgery? No   2.  Do you ever have any pain or discomfort in your chest? No   3.  Do you have a history of  Heart Failure? No   4.   Are you troubled by shortness of breath when:  walking on a level surface, or up a slight hill, or at night? No   5.  Do you currently have a cold, bronchitis or other respiratory infection? No   6.  Do you have a cough, shortness of breath, or wheezing? No   7.  Do you sometimes get pains in the calves of your legs when you walk? No   8. Do you or anyone in your family have previous history of blood clots? No   9.  Do you or does anyone in your family have a serious bleeding problem such as prolonged bleeding following surgeries or cuts? No   10. Have you ever had problems with anemia or been told to take iron pills? No   11. Have you had any abnormal blood loss such as black, tarry or bloody stools, or abnormal vaginal bleeding?  No   12. Have you ever had a blood transfusion? No   13. Have you or any of your relatives ever had problems with anesthesia? No   14. Do you have sleep apnea, excessive snoring or daytime drowsiness? No   15. Do you have any prosthetic heart valves? No   16. Do you have prosthetic joints? No   17. Is there any chance that you may be pregnant? No         HPI:     HPI related to upcoming procedure: Serena Reno is a 69 year old female who presents today for preop for cataract surgery on both eyes one two weeks after the other.       HYPERLIPIDEMIA - Patient has a long history of significant Hyperlipidemia requiring medication for treatment with recent good control. Patient reports no problems or side effects with the medication.     HYPERTENSION - Patient has longstanding history of HTN , currently denies any symptoms referable to elevated blood pressure. Specifically denies chest pain, palpitations, dyspnea, orthopnea, PND or peripheral edema. Blood pressure readings have been in normal range. Current medication regimen is as listed below. Patient denies any side effects of medication.       MEDICAL HISTORY:     Patient Active Problem List    Diagnosis Date Noted     Advanced directives, counseling/discussion 07/30/2012     Priority: Medium     Hypertension goal BP (blood pressure) < 140/90      Priority: Medium     Hyperlipidemia with target LDL less than 130      Priority: Medium     Diagnosis updated by automated process. Provider to review and confirm.        Past Medical History:   Diagnosis Date     Borderline abnormal thyroid function test 7/30/2012     Hyperlipidemia LDL goal < 130      Hypertension goal BP (blood pressure) < 140/90      Past Surgical History:   Procedure Laterality Date     COLONOSCOPY  2009     DILATION AND CURETTAGE      after missed AB     Current Outpatient Medications   Medication Sig Dispense Refill     Ascorbic Acid (VITAMIN C) 500 MG CAPS Take 1 capsule by mouth daily.        bisoprolol-hydrochlorothiazide (ZIAC) 2.5-6.25 MG tablet TAKE 1 TABLET EVERY DAY 90 tablet 3     co-enzyme Q-10 (CO Q 10) 100 MG CAPS Take 1 capsule by mouth daily.       Glucosamine-Chondroit-Vit C-Mn (GLUCOSAMINE CHONDR 1500 COMPLX PO) Take 2 capsules by mouth daily        lovastatin (MEVACOR) 20 MG tablet Take 1 tablet (20 mg) by mouth At Bedtime 90 tablet 3     Multiple Vitamin (MULTIVITAMINS PO) Take 1 capsule by mouth daily. Adult 50 plus       Multiple Vitamins-Minerals (PRESERVISION AREDS 2) CAPS Take 2 capsules by mouth daily.       omega-3 fatty acids (FISH OIL) 1200 MG capsule Take 1 capsule by mouth daily.       vitamin E 400 UNIT capsule Take 1 capsule by mouth daily.       OTC products: no recent use of OTC ASA, NSAIDS or Steroids    No Known Allergies   Latex Allergy: NO    Social History     Tobacco Use     Smoking status: Former Smoker     Packs/day: 0.50     Years: 7.00     Pack years: 3.50     Types: Cigarettes     Start date: 1970     Last attempt to quit: 1977     Years since quittin.8     Smokeless tobacco: Never Used   Substance Use Topics     Alcohol use: Yes     Comment: 5-7 glasses wine per week     History   Drug Use No       REVIEW OF SYSTEMS:   Constitutional, neuro, ENT, endocrine, pulmonary, cardiac, gastrointestinal, genitourinary, musculoskeletal, integument and psychiatric systems are negative, except as otherwise noted.    EXAM:   /82 (BP Location: Left arm, Patient Position: Sitting, Cuff Size: Adult Regular)   Pulse 63   Temp 98.3  F (36.8  C) (Oral)   Resp 18   Wt 84.3 kg (185 lb 12.8 oz)   SpO2 99%   BMI 30.73 kg/m      GENERAL APPEARANCE: healthy, alert and no distress     EYES: EOMI, PERRL     HENT: ear canals and TM's normal and nose and mouth without ulcers or lesions     NECK: no adenopathy, no asymmetry, masses, or scars and thyroid normal to palpation     RESP: lungs clear to auscultation - no rales, rhonchi or wheezes     CV: regular rates and  rhythm, normal S1 S2, no S3 or S4 and no murmur, click or rub     ABDOMEN:  soft, nontender, no HSM or masses and bowel sounds normal     MS: extremities normal- no gross deformities noted, no evidence of inflammation in joints, FROM in all extremities.     SKIN: no suspicious lesions or rashes     NEURO: Normal strength and tone, sensory exam grossly normal, mentation intact and speech normal     PSYCH: mentation appears normal. and affect normal/bright     LYMPHATICS: No cervical adenopathy    DIAGNOSTICS:   No labs or EKG required for low risk surgery (cataract, skin procedure, breast biopsy, etc)    Recent Labs   Lab Test 12/19/19  0959 12/20/18  1025    138   POTASSIUM 4.0 4.1   CR 0.88 0.87        IMPRESSION:   Reason for surgery/procedure: cataract  Diagnosis/reason for consult: preop for cataract surgery     The proposed surgical procedure is considered LOW risk.    REVISED CARDIAC RISK INDEX  The patient has the following serious cardiovascular risks for perioperative complications such as (MI, PE, VFib and 3  AV Block):  No serious cardiac risks  INTERPRETATION: 0 risks: Class I (very low risk - 0.4% complication rate)    The patient has the following additional risks for perioperative complications:  No identified additional risks      ICD-10-CM    1. Preop general physical exam  Z01.818    2. Cataract, unspecified cataract type, unspecified laterality  H26.9    3. Hypertension goal BP (blood pressure) < 140/90  I10 bisoprolol-hydrochlorothiazide (ZIAC) 2.5-6.25 MG tablet   4. Hyperlipidemia LDL goal <130  E78.5 lovastatin (MEVACOR) 20 MG tablet       RECOMMENDATIONS:         --Patient is to take all scheduled medications on the day of surgery    APPROVAL GIVEN to proceed with proposed procedure, without further diagnostic evaluation       Signed Electronically by: Zoya Elizabeth MD     Copy of this evaluation report is provided to requesting physician.    [place] Preop Guidelines    Revised  Cardiac Risk Index

## 2020-09-22 ENCOUNTER — TRANSFERRED RECORDS (OUTPATIENT)
Dept: HEALTH INFORMATION MANAGEMENT | Facility: CLINIC | Age: 70
End: 2020-09-22

## 2020-09-22 LAB — HEMOCCULT STL QL IA: NEGATIVE

## 2020-12-09 ENCOUNTER — MYC MEDICAL ADVICE (OUTPATIENT)
Dept: FAMILY MEDICINE | Facility: CLINIC | Age: 70
End: 2020-12-09

## 2020-12-09 DIAGNOSIS — I10 HYPERTENSION GOAL BP (BLOOD PRESSURE) < 140/90: Primary | ICD-10-CM

## 2020-12-09 DIAGNOSIS — E78.5 HYPERLIPIDEMIA WITH TARGET LDL LESS THAN 130: ICD-10-CM

## 2020-12-31 DIAGNOSIS — Z12.31 VISIT FOR SCREENING MAMMOGRAM: ICD-10-CM

## 2020-12-31 PROCEDURE — 77063 BREAST TOMOSYNTHESIS BI: CPT | Mod: TC | Performed by: RADIOLOGY

## 2020-12-31 PROCEDURE — 77067 SCR MAMMO BI INCL CAD: CPT | Mod: TC | Performed by: RADIOLOGY

## 2021-02-22 DIAGNOSIS — I10 HYPERTENSION GOAL BP (BLOOD PRESSURE) < 140/90: ICD-10-CM

## 2021-02-25 RX ORDER — BISOPROLOL FUMARATE AND HYDROCHLOROTHIAZIDE 2.5; 6.25 MG/1; MG/1
TABLET ORAL
Qty: 90 TABLET | Refills: 3 | OUTPATIENT
Start: 2021-02-25

## 2021-02-25 NOTE — TELEPHONE ENCOUNTER
bisoprolol-hydrochlorothiazide (ZIAC) 2.5-6.25 MG tablet    Message sent to pharmacy - Refusal reason: Should already have refills on file (ORDER S[facility] 7/7/20 [facility] Y[facility]  Brijesh PARKER

## 2021-02-27 ENCOUNTER — HEALTH MAINTENANCE LETTER (OUTPATIENT)
Age: 71
End: 2021-02-27

## 2021-05-10 DIAGNOSIS — E78.5 HYPERLIPIDEMIA LDL GOAL <130: ICD-10-CM

## 2021-05-12 RX ORDER — LOVASTATIN 20 MG
20 TABLET ORAL AT BEDTIME
Qty: 90 TABLET | Refills: 0 | Status: SHIPPED | OUTPATIENT
Start: 2021-05-12 | End: 2021-11-09

## 2021-05-12 NOTE — TELEPHONE ENCOUNTER
Medication is being filled for 1 time refill only due to:  Patient needs to be seen because it has been more than one year since last visit. Please call and assist with an appointment-Also needs fasting labs. Arin Wakefield RN

## 2021-07-27 DIAGNOSIS — I10 HYPERTENSION GOAL BP (BLOOD PRESSURE) < 140/90: ICD-10-CM

## 2021-07-28 DIAGNOSIS — I10 HYPERTENSION GOAL BP (BLOOD PRESSURE) < 140/90: ICD-10-CM

## 2021-07-29 RX ORDER — BISOPROLOL FUMARATE AND HYDROCHLOROTHIAZIDE 2.5; 6.25 MG/1; MG/1
TABLET ORAL
Qty: 90 TABLET | Refills: 0 | Status: SHIPPED | OUTPATIENT
Start: 2021-07-29 | End: 2021-11-09

## 2021-08-02 RX ORDER — BISOPROLOL FUMARATE AND HYDROCHLOROTHIAZIDE 2.5; 6.25 MG/1; MG/1
TABLET ORAL
Qty: 90 TABLET | Refills: 3 | OUTPATIENT
Start: 2021-08-02

## 2021-10-02 ENCOUNTER — HEALTH MAINTENANCE LETTER (OUTPATIENT)
Age: 71
End: 2021-10-02

## 2021-10-19 ENCOUNTER — TRANSFERRED RECORDS (OUTPATIENT)
Dept: HEALTH INFORMATION MANAGEMENT | Facility: CLINIC | Age: 71
End: 2021-10-19
Payer: COMMERCIAL

## 2021-10-19 LAB — ABSTRACT IFOB-NO CHARGE: NEGATIVE

## 2021-11-08 SDOH — HEALTH STABILITY: PHYSICAL HEALTH: ON AVERAGE, HOW MANY DAYS PER WEEK DO YOU ENGAGE IN MODERATE TO STRENUOUS EXERCISE (LIKE A BRISK WALK)?: 4 DAYS

## 2021-11-08 SDOH — ECONOMIC STABILITY: TRANSPORTATION INSECURITY
IN THE PAST 12 MONTHS, HAS THE LACK OF TRANSPORTATION KEPT YOU FROM MEDICAL APPOINTMENTS OR FROM GETTING MEDICATIONS?: NO

## 2021-11-08 SDOH — ECONOMIC STABILITY: TRANSPORTATION INSECURITY
IN THE PAST 12 MONTHS, HAS LACK OF TRANSPORTATION KEPT YOU FROM MEETINGS, WORK, OR FROM GETTING THINGS NEEDED FOR DAILY LIVING?: NO

## 2021-11-08 SDOH — ECONOMIC STABILITY: FOOD INSECURITY: WITHIN THE PAST 12 MONTHS, YOU WORRIED THAT YOUR FOOD WOULD RUN OUT BEFORE YOU GOT MONEY TO BUY MORE.: NEVER TRUE

## 2021-11-08 SDOH — ECONOMIC STABILITY: FOOD INSECURITY: WITHIN THE PAST 12 MONTHS, THE FOOD YOU BOUGHT JUST DIDN'T LAST AND YOU DIDN'T HAVE MONEY TO GET MORE.: NEVER TRUE

## 2021-11-08 SDOH — ECONOMIC STABILITY: INCOME INSECURITY: IN THE LAST 12 MONTHS, WAS THERE A TIME WHEN YOU WERE NOT ABLE TO PAY THE MORTGAGE OR RENT ON TIME?: NO

## 2021-11-08 SDOH — HEALTH STABILITY: PHYSICAL HEALTH: ON AVERAGE, HOW MANY MINUTES DO YOU ENGAGE IN EXERCISE AT THIS LEVEL?: 30 MIN

## 2021-11-08 SDOH — ECONOMIC STABILITY: INCOME INSECURITY: HOW HARD IS IT FOR YOU TO PAY FOR THE VERY BASICS LIKE FOOD, HOUSING, MEDICAL CARE, AND HEATING?: NOT VERY HARD

## 2021-11-08 ASSESSMENT — SOCIAL DETERMINANTS OF HEALTH (SDOH)
HOW OFTEN DO YOU GET TOGETHER WITH FRIENDS OR RELATIVES?: ONCE A WEEK
DO YOU BELONG TO ANY CLUBS OR ORGANIZATIONS SUCH AS CHURCH GROUPS UNIONS, FRATERNAL OR ATHLETIC GROUPS, OR SCHOOL GROUPS?: YES
HOW OFTEN DO YOU ATTEND CHURCH OR RELIGIOUS SERVICES?: MORE THAN 4 TIMES PER YEAR
IN A TYPICAL WEEK, HOW MANY TIMES DO YOU TALK ON THE PHONE WITH FAMILY, FRIENDS, OR NEIGHBORS?: MORE THAN THREE TIMES A WEEK

## 2021-11-08 ASSESSMENT — LIFESTYLE VARIABLES
HOW OFTEN DO YOU HAVE SIX OR MORE DRINKS ON ONE OCCASION: NEVER
HOW OFTEN DO YOU HAVE A DRINK CONTAINING ALCOHOL: 2-3 TIMES A WEEK
HOW MANY STANDARD DRINKS CONTAINING ALCOHOL DO YOU HAVE ON A TYPICAL DAY: 1 OR 2

## 2021-11-08 ASSESSMENT — ENCOUNTER SYMPTOMS
EYE PAIN: 0
WEAKNESS: 0
DIZZINESS: 0
FREQUENCY: 0
PARESTHESIAS: 0
CONSTIPATION: 0
MYALGIAS: 0
NERVOUS/ANXIOUS: 0
HEMATOCHEZIA: 0
FEVER: 0
NAUSEA: 0
ARTHRALGIAS: 1
SHORTNESS OF BREATH: 0
COUGH: 0
BREAST MASS: 0
CHILLS: 0
ABDOMINAL PAIN: 0
HEADACHES: 0
DIARRHEA: 0
HEARTBURN: 0
HEMATURIA: 0
SORE THROAT: 0
JOINT SWELLING: 0
DYSURIA: 0
PALPITATIONS: 0

## 2021-11-08 ASSESSMENT — ACTIVITIES OF DAILY LIVING (ADL): CURRENT_FUNCTION: NO ASSISTANCE NEEDED

## 2021-11-09 ENCOUNTER — OFFICE VISIT (OUTPATIENT)
Dept: FAMILY MEDICINE | Facility: CLINIC | Age: 71
End: 2021-11-09
Payer: COMMERCIAL

## 2021-11-09 VITALS
OXYGEN SATURATION: 100 % | HEIGHT: 65 IN | DIASTOLIC BLOOD PRESSURE: 98 MMHG | BODY MASS INDEX: 32.65 KG/M2 | TEMPERATURE: 97.9 F | RESPIRATION RATE: 16 BRPM | SYSTOLIC BLOOD PRESSURE: 143 MMHG | HEART RATE: 66 BPM | WEIGHT: 196 LBS

## 2021-11-09 DIAGNOSIS — Z00.00 ENCOUNTER FOR MEDICARE ANNUAL WELLNESS EXAM: Primary | ICD-10-CM

## 2021-11-09 DIAGNOSIS — E78.5 HYPERLIPIDEMIA LDL GOAL <130: ICD-10-CM

## 2021-11-09 DIAGNOSIS — I10 HYPERTENSION GOAL BP (BLOOD PRESSURE) < 140/90: ICD-10-CM

## 2021-11-09 DIAGNOSIS — M85.852 OSTEOPENIA OF LEFT HIP: ICD-10-CM

## 2021-11-09 DIAGNOSIS — Z12.11 SCREENING FOR COLON CANCER: ICD-10-CM

## 2021-11-09 DIAGNOSIS — B00.1 COLD SORE: ICD-10-CM

## 2021-11-09 PROCEDURE — 36415 COLL VENOUS BLD VENIPUNCTURE: CPT | Performed by: FAMILY MEDICINE

## 2021-11-09 PROCEDURE — 80053 COMPREHEN METABOLIC PANEL: CPT | Performed by: FAMILY MEDICINE

## 2021-11-09 PROCEDURE — 99397 PER PM REEVAL EST PAT 65+ YR: CPT | Performed by: FAMILY MEDICINE

## 2021-11-09 PROCEDURE — 99213 OFFICE O/P EST LOW 20 MIN: CPT | Mod: 25 | Performed by: FAMILY MEDICINE

## 2021-11-09 PROCEDURE — 80061 LIPID PANEL: CPT | Performed by: FAMILY MEDICINE

## 2021-11-09 PROCEDURE — 82043 UR ALBUMIN QUANTITATIVE: CPT | Performed by: FAMILY MEDICINE

## 2021-11-09 RX ORDER — LOVASTATIN 20 MG
20 TABLET ORAL AT BEDTIME
Qty: 90 TABLET | Refills: 3 | Status: SHIPPED | OUTPATIENT
Start: 2021-11-09 | End: 2022-11-17

## 2021-11-09 RX ORDER — BISOPROLOL FUMARATE AND HYDROCHLOROTHIAZIDE 2.5; 6.25 MG/1; MG/1
TABLET ORAL
Qty: 90 TABLET | Refills: 3 | Status: SHIPPED | OUTPATIENT
Start: 2021-11-09 | End: 2022-10-11

## 2021-11-09 ASSESSMENT — ENCOUNTER SYMPTOMS
NAUSEA: 0
NERVOUS/ANXIOUS: 0
JOINT SWELLING: 0
PARESTHESIAS: 0
HEADACHES: 0
WEAKNESS: 0
SORE THROAT: 0
CHILLS: 0
HEARTBURN: 0
EYE PAIN: 0
FEVER: 0
MYALGIAS: 0
ABDOMINAL PAIN: 0
DYSURIA: 0
DIZZINESS: 0
DIARRHEA: 0
CONSTIPATION: 0
HEMATOCHEZIA: 0
COUGH: 0
PALPITATIONS: 0
FREQUENCY: 0
HEMATURIA: 0
SHORTNESS OF BREATH: 0
BREAST MASS: 0

## 2021-11-09 ASSESSMENT — MIFFLIN-ST. JEOR: SCORE: 1404.93

## 2021-11-09 ASSESSMENT — ACTIVITIES OF DAILY LIVING (ADL): CURRENT_FUNCTION: NO ASSISTANCE NEEDED

## 2021-11-09 NOTE — PATIENT INSTRUCTIONS
I recommend getting the new shingles vaccine, Shingrix.  You can call your insurance company to find out if this vaccine is covered.  You may also ask our pharmacy to check if your insurance covers Shingrix if given at the pharmacy.        Patient Education   Personalized Prevention Plan  You are due for the preventive services outlined below.  Your care team is available to assist you in scheduling these services.  If you have already completed any of these items, please share that information with your care team to update in your medical record.  Health Maintenance Due   Topic Date Due     ANNUAL REVIEW OF HM ORDERS  Never done     LUNG CANCER SCREENING  Never done     Zoster (Shingles) Vaccine (2 of 3) 09/29/2014     Annual Wellness Visit  12/19/2020     Basic Metabolic Panel  12/19/2020     Cholesterol Lab  12/19/2020     FALL RISK ASSESSMENT  12/19/2020     COVID-19 Vaccine (3 - Booster for Pfizer series) 08/20/2021     Flu Vaccine (1) 09/01/2021     Colorectal Cancer Screening  09/22/2021

## 2021-11-09 NOTE — LETTER
[facility] [address] [place] [address]  Phone: [phone]    November 9, 2021        Serena MARIE Rowdy  [address] [address]          To whom it may concern:    RE: Serena MARIE Rowdy    I recommend a BMI goal between 23 and 30 for Serena. She would like to set the goal of 170lbs, which is in reasonable range.     Please contact me for questions or concerns.      Sincerely,        Zoya Elizabeth MD

## 2021-11-09 NOTE — Clinical Note
2021       Sherri Lee MD  [address] [place] [address]  [facility] [address]  Via In Basket      Patient: Nasima Flores   YOB: 1993   Date of Visit: 2021       Dear Dr. Lee:    Thank you for referring Nasima Flores to me for evaluation. Below are my notes for this visit with her.    If you have questions, please do not hesitate to call me. I look forward to following your patient along with you.      Sincerely,        HEENA TOMLINSON MD        CC: No Recipients  Parth Esquivel MD  2021  3:02 PM  Signed  MATERNAL FETAL MEDICINE CONSULT     Nasima Flores   2021    REFERRING PROVIDER: Dr. Lee    Nasima is a 27 year old   with intrauterine pregnancy at 12w4d who presents today for an MFM consult due to history of oligohydramnios and termination at 16 weeks during her first pregnancy.  This pregnancy is also notable for hypothyroidism..     HPI/CURRENT PREGNANCY: This pregnancy is complicated by history of oligohydramnios and termination at 16 weeks during her first pregnancy.  This pregnancy is also notable for hypothyroidism...    I have reviewed Nasima's previous OB ultrasound reports, pertinent prenatal labwork and testing provided by her referring OB provider.     Current Outpatient Medications   Medication Sig Dispense Refill   • levothyroxine 50 MCG tablet Take 50 mcg by mouth daily.     • Prenatal Vit-Fe Fumarate-FA (multivitamin & mineral w/folic acid- PRENATAL) 27-1 MG Tab Take 1 tablet by mouth daily.       No current facility-administered medications for this visit.       ?   OB History    Para Term  AB Living   2       1     SAB TAB Ectopic Molar Multiple Live Births     1              # Outcome Date GA Lbr Mynor/2nd Weight Sex Delivery Anes PTL Lv   2 Current            1 TAB 19 16w0d    ELECTIVE ABO   FD      Birth Comments: Anhydramnios - elective termination @ 16 weeks      Obstetric Comments   Anhydramnios diagnosed @ 12  Per pt self report she completed FIT test 10/2021 and it was normal.  weeks with first pregnancy in 2019, elective NSD termination @ 16 weeks without complications. 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    ?   ?   Review of patient's allergies indicates:   ALLERGIES:  No Known Allergies     Past Medical History:   Diagnosis Date   • Thyroid disease     HYPOTHYROIDISM       Past Surgical History:   Procedure Laterality Date   • No past surgeries         Family History   Problem Relation Age of Onset   • Diabetes Mother    • Diabetes Father       ?   Social History     Tobacco Use   • Smoking status: Never Smoker   • Smokeless tobacco: Never Used   Substance Use Topics   • Alcohol use: Never     Frequency: Never   • Drug use: Never    ?   ?   REVIEW OF SYSTEMS:   Headaches: None  nausea/vomiting:  None  abdominal pain/tenderness/cramping/contractions: none  vaginal bleeding: none  vaginal leaking of fluid: none   leg pain/tenderness: none  all other systems negative unless noted in the HPI    PHYSICAL EXAM:   Visit Vitals  /64 (BP Location: RUE - Right upper extremity, Patient Position: Sitting, Cuff Size: Regular)   Ht 5' 4\" (1.626 m)   Wt 68 kg (150 lb)   LMP 2020   BMI 25.75 kg/m²       General: A&Ox3, NAD, pleasant  Pelvic Exam: deferred     ULTRASOUND RESULTS:    Ultrasound displays fetus which measures at 12 weeks and 2 days.  Fetal heart rate is 175 bpm.  Fluid volume is within normal limits.  The nuchal thickness is normal at 1.7 mm    DISCUSSION/RECOMMENDATIONS:     I counseled the patient that the ultrasound today is within normal limits.  Oligohydramnios in the early second trimester can be secondary to renal agenesis or rupture of membranes.  The fluid volume is within normal limits on today's ultrasound.    Untreated or partially treated hypothyroidism is associated with increased risk of preeclampsia, abruption,   birth, low birth weight, fetal death, and long term impaired psychomotor function. The goal of levothyroxine treatment is maternal serum TSH of 0.5 to 2.5 uU/ml, and free T4 in the upper third of normal range.  Free T4 and TSH levels should be checked once a trimester. After altering doses, TSH and Free T4 should be checked every 4 weeks until the TSH is normal.     Plan:    1.  First trimester combined screen was performed today.  We will contact the patient with the results when available    2.  Anatomy ultrasound at 20 weeks.  This is scheduled with [facility]    3.  After the anatomy ultrasound, would recommend growth ultrasounds every 6 weeks    4.  Patient was counseled on nutrition in pregnancy.    5.Goal of levothyroxine treatment with TSH of 0.5 to 2.5 uU/ml and free T4 in the upper third of normal range    6. TSH and free T4 levels should be checked at least every trimester    7.  After making alterations to the dose of levothyroxine, TSH and free T4 should be checked every 4 weeks until the TSH is normal.    8.  We will contact the patient with the result of the first trimester combined screen when available      Thank you for the opportunity to assist with [facility]'s obstetrical care. Please do not hesitate to contact me if you have any questions.    Total visit length: 30 minutes, with more than 50% of time dedicated to face-to-face counseling regarding plan of care.    A letter regarding this visit has been sent to the referring provider.      Parth Esquivel MD  2/9/2021  2:58 PM

## 2021-11-09 NOTE — PROGRESS NOTES
"SUBJECTIVE:   Serena Reno is a 71 year old female who presents for Preventive Visit.       Patient has been advised of split billing requirements and indicates understanding: Yes   Are you in the first 12 months of your Medicare coverage?  No    Healthy Habits:     In general, how would you rate your overall health?  Good    Frequency of exercise:  4-5 days/week    Duration of exercise:  15-30 minutes    Do you usually eat at least 4 servings of fruit and vegetables a day, include whole grains    & fiber and avoid regularly eating high fat or \"junk\" foods?  Yes    Taking medications regularly:  Yes    Medication side effects:  None    Ability to successfully perform activities of daily living:  No assistance needed    Home Safety:  No safety concerns identified    Hearing Impairment:  No hearing concerns    In the past 6 months, have you been bothered by leaking of urine?  No    In general, how would you rate your overall mental or emotional health?  Good      PHQ-2 Total Score: 0    Additional concerns today:  No    Do you feel safe in your environment? Yes    Have you ever done Advance Care Planning? (For example, a Health Directive, POLST, or a discussion with a medical provider or your loved ones about your wishes): Yes, advance care planning is on file.       Fall risk  Fallen 2 or more times in the past year?: No  Any fall with injury in the past year?: No    Cognitive Screening   1) Repeat 3 items (Leader, Season, Table)    2) Clock draw: NORMAL  3) 3 item recall: Recalls 3 objects  Results: 3 items recalled: COGNITIVE IMPAIRMENT LESS LIKELY    Mini-CogTM Copyright MANDI So. Licensed by the author for use in [facility]; reprinted with permission (margie@.[facility]). All rights reserved.          Reviewed and updated as needed this visit by clinical staff  Tobacco  Allergies  Meds             Reviewed and updated as needed this visit by Provider               Social History     Tobacco Use     " Smoking status: Former Smoker     Packs/day: 0.50     Years: 7.00     Pack years: 3.50     Types: Cigarettes     Start date: 1970     Quit date: 1977     Years since quittin.2     Smokeless tobacco: Never Used   Substance Use Topics     Alcohol use: Yes     Comment: 5-7 glasses wine per week         Alcohol Use 2021   Prescreen: >3 drinks/day or >7 drinks/week? No   Prescreen: >3 drinks/day or >7 drinks/week? -               Current providers sharing in care for this patient include:   Patient Care Team:  Zoya Elizabeth MD as PCP - General (Family Practice)  Zoya Elizabeth MD as Assigned PCP    The following health maintenance items are reviewed in Epic and correct as of today:  Health Maintenance Due   Topic Date Due     ANNUAL REVIEW OF HM ORDERS  Never done     ZOSTER IMMUNIZATION (2 of 3) 2014     BMP  2020     LIPID  2020     FALL RISK ASSESSMENT  2020     COLORECTAL CANCER SCREENING  2021               Review of Systems   Constitutional: Negative for chills and fever.   HENT: Negative for congestion, ear pain, hearing loss and sore throat.    Eyes: Negative for pain and visual disturbance.   Respiratory: Negative for cough and shortness of breath.    Cardiovascular: Negative for chest pain, palpitations and peripheral edema.   Gastrointestinal: Negative for abdominal pain, constipation, diarrhea, heartburn, hematochezia and nausea.   Breasts:  Negative for breast mass and discharge.   Genitourinary: Negative for dysuria, frequency, genital sores, hematuria, pelvic pain, urgency, vaginal bleeding and vaginal discharge.   Musculoskeletal: Negative for joint swelling and myalgias. Gait problem: ANNUAL.   Skin: Negative for rash.   Neurological: Negative for dizziness, weakness, headaches and paresthesias.   Psychiatric/Behavioral: Negative for mood changes. The patient is not nervous/anxious.          OBJECTIVE:   BP (!) 112/90   Pulse 69   Temp 97.9  F (36.6  " C) (Temporal)   Resp 16   Ht 1.651 m (5' 5\")   Wt 88.9 kg (196 lb)   SpO2 100%   BMI 32.62 kg/m   Estimated body mass index is 32.62 kg/m  as calculated from the following:    Height as of this encounter: 1.651 m (5' 5\").    Weight as of this encounter: 88.9 kg (196 lb).  Physical Exam  GENERAL: healthy, alert and no distress  EYES: Eyes grossly normal to inspection, PERRL and conjunctivae and sclerae normal  HENT: ear canals and TM's normal, nose and mouth without ulcers or lesions  NECK: no adenopathy, no asymmetry, masses, or scars and thyroid normal to palpation  RESP: lungs clear to auscultation - no rales, rhonchi or wheezes  CV: regular rate and rhythm, normal S1 S2, no S3 or S4, no murmur, click or rub, no peripheral edema and peripheral pulses strong  ABDOMEN: soft, nontender, no hepatosplenomegaly, no masses and bowel sounds normal  MS: no gross musculoskeletal defects noted, no edema  SKIN: no suspicious lesions or rashes  NEURO: Normal strength and tone, mentation intact and speech normal  PSYCH: mentation appears normal, affect normal/bright    Diagnostic Test Results:  Labs reviewed in Epic    ASSESSMENT / PLAN:       ICD-10-CM    1. Encounter for Medicare annual wellness exam  Z00.00    2. Hypertension goal BP (blood pressure) < 140/90  I10    3. Hyperlipidemia LDL goal <130  E78.5    4. Cold sore  B00.1    5. Osteopenia of left hip  M85.852    6. Screening for colon cancer  Z12.11       Encounter for routine adult health examination without abnormal findings  Healthy   I recommended scheduling mammogram. Last was 12/2020  Cologuard ordered for colon cancer screening  Shingrix vaccine was recommended  Flu shot completed for the season  She has received both doses of the Pfizer COVID-19 vaccine.  Booster dose completed as well.      Hypertension goal BP (blood pressure) < 140/90  No medication changes today. CMP and urine albumin for monitoring.   Initially just very slightly elevated, norma " "recheck with MA. she has a home bp monitor, but has not been checking. I recommended checking her bp at home periodically as well.   Continues bisoprolol--hydrochlorothiazide 2.5-6.25 mg daily. Labs today for monitoring as well.   - bisoprolol-hydrochlorothiazide (ZIAC) 2.5-6.25 MG tablet; TAKE 1 TABLET EVERY DAY  Dispense: 90 tablet; Refill: 3  -CMP  - Albumin Random Urine Quantitative with Creat Ratio     Hyperlipidemia LDL goal <130  stable.   Continues lovastatin 20 mg daily.  CMP and lipids today for medication monitoring  - lovastatin (MEVACOR) 20 MG tablet; Take 1 tablet (20 mg) by mouth At Bedtime  Dispense: 90 tablet; Refill: 3  - Lipid panel reflex to direct LDL Fasting      Colon cancer screening  Cologuard ordered today, but then she said she just competed a fit test and it was normal (completed through insurance mid October). She may consider cologuard for next year.      Osteopenia, left hip  DEXA completed 12/2019 showing left hip osteopenia. Repeat DEXA in 12/2022  FRAX 10-YEAR PROBABILITY OF FRACTURE*:  Major Osteoporotic: 8%  Hip: 1%      Cold sore -- not discussed today   per last wellness visit notes: Occasional cold sore.  She had previously used acyclovir ointment, however due to the expense she prefers not to use medication.  She typically gets about 1 cold sore per year.         COUNSELING:  Reviewed preventive health counseling, as reflected in patient instructions    Estimated body mass index is 32.62 kg/m  as calculated from the following:    Height as of this encounter: 1.651 m (5' 5\").    Weight as of this encounter: 88.9 kg (196 lb).    Weight management plan: she is doing weight watchers    She reports that she quit smoking about 44 years ago. Her smoking use included cigarettes. She started smoking about 51 years ago. She has a 3.50 pack-year smoking history. She has never used smokeless tobacco.      Appropriate preventive services were discussed with this patient, including " applicable screening as appropriate for cardiovascular disease, diabetes, osteopenia/osteoporosis, and glaucoma.  As appropriate for age/gender, discussed screening for colorectal cancer, prostate cancer, breast cancer, and cervical cancer. Checklist reviewing preventive services available has been given to the patient.    Reviewed patients plan of care and provided an AVS. The Intermediate Care Plan ( asthma action plan, low back pain action plan, and migraine action plan) for Serena meets the Care Plan requirement. This Care Plan has been established and reviewed with the Patient.    Counseling Resources:  ATP IV Guidelines  Pooled Cohorts Equation Calculator  Breast Cancer Risk Calculator  Breast Cancer: Medication to Reduce Risk  FRAX Risk Assessment  ICSI Preventive Guidelines  Dietary Guidelines for Americans, 2010  USDA's MyPlate  ASA Prophylaxis  Lung CA Screening    Zoya Elizabeth MD, MD  [facility]    Identified Health Risks:

## 2021-11-10 ENCOUNTER — MYC MEDICAL ADVICE (OUTPATIENT)
Dept: FAMILY MEDICINE | Facility: CLINIC | Age: 71
End: 2021-11-10
Payer: COMMERCIAL

## 2021-11-10 DIAGNOSIS — E78.5 HYPERLIPIDEMIA LDL GOAL <130: Primary | ICD-10-CM

## 2021-11-10 LAB
ALBUMIN SERPL-MCNC: 3.9 G/DL (ref 3.4–5)
ALP SERPL-CCNC: 41 U/L (ref 40–150)
ALT SERPL W P-5'-P-CCNC: 25 U/L (ref 0–50)
ANION GAP SERPL CALCULATED.3IONS-SCNC: 8 MMOL/L (ref 3–14)
AST SERPL W P-5'-P-CCNC: 20 U/L (ref 0–45)
BILIRUB SERPL-MCNC: 0.5 MG/DL (ref 0.2–1.3)
BUN SERPL-MCNC: 21 MG/DL (ref 7–30)
CALCIUM SERPL-MCNC: 9.5 MG/DL (ref 8.5–10.1)
CHLORIDE BLD-SCNC: 103 MMOL/L (ref 94–109)
CHOLEST SERPL-MCNC: 221 MG/DL
CO2 SERPL-SCNC: 25 MMOL/L (ref 20–32)
CREAT SERPL-MCNC: 0.9 MG/DL (ref 0.52–1.04)
CREAT UR-MCNC: 33 MG/DL
FASTING STATUS PATIENT QL REPORTED: YES
GFR SERPL CREATININE-BSD FRML MDRD: 65 ML/MIN/1.73M2
GLUCOSE BLD-MCNC: 92 MG/DL (ref 70–99)
HDLC SERPL-MCNC: 79 MG/DL
LDLC SERPL CALC-MCNC: 126 MG/DL
MICROALBUMIN UR-MCNC: <5 MG/L
MICROALBUMIN/CREAT UR: NORMAL MG/G{CREAT}
NONHDLC SERPL-MCNC: 142 MG/DL
POTASSIUM BLD-SCNC: 4 MMOL/L (ref 3.4–5.3)
PROT SERPL-MCNC: 7.1 G/DL (ref 6.8–8.8)
SODIUM SERPL-SCNC: 136 MMOL/L (ref 133–144)
TRIGL SERPL-MCNC: 82 MG/DL

## 2021-11-10 RX ORDER — ATORVASTATIN CALCIUM 20 MG/1
20 TABLET, FILM COATED ORAL DAILY
Qty: 90 TABLET | Refills: 3 | Status: SHIPPED | OUTPATIENT
Start: 2021-11-10 | End: 2022-10-11

## 2021-11-10 NOTE — TELEPHONE ENCOUNTER
Pt is amenable to change in medication and asks it be sent to her mail order pharmacy.      Cecily Mena RN  [facility]

## 2021-11-10 NOTE — TELEPHONE ENCOUNTER
RN-please call Serena regarding her results. I think we can do a better job lowering her cholesterol if we switch her statin. I recommend switching from lovastatin to atorvastatin 20 mg daily. Medication pended. If she is open to this change, please send to the pharmacy of her choice. The rest of her labs were normal. Zoya Elizabeth M.D.       Recent Results (from the past 720 hour(s))   Albumin Random Urine Quantitative with Creat Ratio    Collection Time: 11/09/21 10:44 AM   Result Value Ref Range    Creatinine Urine mg/dL 33 mg/dL    Albumin Urine mg/L <5 mg/L    Albumin Urine mg/g Cr     Comprehensive metabolic panel (BMP + Alb, Alk Phos, ALT, AST, Total. Bili, TP)    Collection Time: 11/09/21 10:44 AM   Result Value Ref Range    Sodium 136 133 - 144 mmol/L    Potassium 4.0 3.4 - 5.3 mmol/L    Chloride 103 94 - 109 mmol/L    Carbon Dioxide (CO2) 25 20 - 32 mmol/L    Anion Gap 8 3 - 14 mmol/L    Urea Nitrogen 21 7 - 30 mg/dL    Creatinine 0.90 0.52 - 1.04 mg/dL    Calcium 9.5 8.5 - 10.1 mg/dL    Glucose 92 70 - 99 mg/dL    Alkaline Phosphatase 41 40 - 150 U/L    AST 20 0 - 45 U/L    ALT 25 0 - 50 U/L    Protein Total 7.1 6.8 - 8.8 g/dL    Albumin 3.9 3.4 - 5.0 g/dL    Bilirubin Total 0.5 0.2 - 1.3 mg/dL    GFR Estimate 65 >60 mL/min/1.73m2   Lipid panel reflex to direct LDL Fasting    Collection Time: 11/09/21 10:44 AM   Result Value Ref Range    Cholesterol 221 (H) <200 mg/dL    Triglycerides 82 <150 mg/dL    Direct Measure HDL 79 >=50 mg/dL    LDL Cholesterol Calculated 126 (H) <=100 mg/dL    Non HDL Cholesterol 142 (H) <130 mg/dL    Patient Fasting > 8hrs? Yes

## 2022-01-12 ENCOUNTER — ANCILLARY PROCEDURE (OUTPATIENT)
Dept: MAMMOGRAPHY | Facility: CLINIC | Age: 72
End: 2022-01-12
Attending: FAMILY MEDICINE
Payer: COMMERCIAL

## 2022-01-12 DIAGNOSIS — Z12.31 VISIT FOR SCREENING MAMMOGRAM: ICD-10-CM

## 2022-01-12 PROCEDURE — 77063 BREAST TOMOSYNTHESIS BI: CPT | Mod: TC | Performed by: RADIOLOGY

## 2022-01-12 PROCEDURE — 77067 SCR MAMMO BI INCL CAD: CPT | Mod: TC | Performed by: RADIOLOGY

## 2022-09-03 ENCOUNTER — HEALTH MAINTENANCE LETTER (OUTPATIENT)
Age: 72
End: 2022-09-03

## 2022-10-10 DIAGNOSIS — E78.5 HYPERLIPIDEMIA LDL GOAL <130: ICD-10-CM

## 2022-10-10 DIAGNOSIS — I10 HYPERTENSION GOAL BP (BLOOD PRESSURE) < 140/90: ICD-10-CM

## 2022-10-11 RX ORDER — ATORVASTATIN CALCIUM 20 MG/1
TABLET, FILM COATED ORAL
Qty: 90 TABLET | Refills: 0 | Status: SHIPPED | OUTPATIENT
Start: 2022-10-11 | End: 2022-11-17

## 2022-10-11 RX ORDER — BISOPROLOL FUMARATE AND HYDROCHLOROTHIAZIDE 2.5; 6.25 MG/1; MG/1
TABLET ORAL
Qty: 90 TABLET | Refills: 0 | Status: SHIPPED | OUTPATIENT
Start: 2022-10-11 | End: 2022-11-17

## 2022-10-11 NOTE — TELEPHONE ENCOUNTER
Routing refill request to provider for review/approval because:  Blood pressure out of range per [facility] protocol  BP Readings from Last 3 Encounters:   11/09/21 (!) 143/98   07/07/20 138/82   03/11/20 130/82     Last Written Prescription Date:  11/9/211  Last Fill Quantity: 90,  # refills: 3   Last office visit: 11/9/2021 with prescribing provider:  House   Future Office Visit:      Atorvastatin refilled x1 per protocol     Team Coordinators: Please contact patient to set up annual wellness for any further refills.     CELIA HolmanN RN  [facility]

## 2022-10-17 ENCOUNTER — TRANSFERRED RECORDS (OUTPATIENT)
Dept: HEALTH INFORMATION MANAGEMENT | Facility: CLINIC | Age: 72
End: 2022-10-17

## 2022-10-17 LAB — HEMOCCULT STL QL IA: NORMAL

## 2022-11-15 ASSESSMENT — ENCOUNTER SYMPTOMS
MYALGIAS: 0
CHILLS: 0
SORE THROAT: 0
NAUSEA: 0
COUGH: 0
ARTHRALGIAS: 0
SHORTNESS OF BREATH: 0
HEMATOCHEZIA: 0
ABDOMINAL PAIN: 0
PARESTHESIAS: 0
DIZZINESS: 0
EYE PAIN: 0
DIARRHEA: 0
FREQUENCY: 0
HEARTBURN: 0
CONSTIPATION: 0
HEADACHES: 0
NERVOUS/ANXIOUS: 0
JOINT SWELLING: 0
FEVER: 0
BREAST MASS: 0
PALPITATIONS: 0
DYSURIA: 0
WEAKNESS: 0
HEMATURIA: 0

## 2022-11-15 ASSESSMENT — ACTIVITIES OF DAILY LIVING (ADL): CURRENT_FUNCTION: NO ASSISTANCE NEEDED

## 2022-11-17 ENCOUNTER — VIRTUAL VISIT (OUTPATIENT)
Dept: FAMILY MEDICINE | Facility: CLINIC | Age: 72
End: 2022-11-17
Payer: COMMERCIAL

## 2022-11-17 DIAGNOSIS — E78.5 HYPERLIPIDEMIA LDL GOAL <130: ICD-10-CM

## 2022-11-17 DIAGNOSIS — Z78.0 POST-MENOPAUSAL: ICD-10-CM

## 2022-11-17 DIAGNOSIS — Z00.00 ENCOUNTER FOR MEDICARE ANNUAL WELLNESS EXAM: Primary | ICD-10-CM

## 2022-11-17 DIAGNOSIS — Z12.31 SCREENING MAMMOGRAM, ENCOUNTER FOR: ICD-10-CM

## 2022-11-17 DIAGNOSIS — M85.852 OSTEOPENIA OF LEFT HIP: ICD-10-CM

## 2022-11-17 DIAGNOSIS — I10 HYPERTENSION GOAL BP (BLOOD PRESSURE) < 140/90: ICD-10-CM

## 2022-11-17 DIAGNOSIS — Z12.11 SCREENING FOR COLON CANCER: ICD-10-CM

## 2022-11-17 PROCEDURE — G0439 PPPS, SUBSEQ VISIT: HCPCS | Mod: 95 | Performed by: FAMILY MEDICINE

## 2022-11-17 PROCEDURE — 99214 OFFICE O/P EST MOD 30 MIN: CPT | Mod: 95 | Performed by: FAMILY MEDICINE

## 2022-11-17 RX ORDER — ATORVASTATIN CALCIUM 20 MG/1
20 TABLET, FILM COATED ORAL DAILY
Qty: 90 TABLET | Refills: 3 | Status: SHIPPED | OUTPATIENT
Start: 2022-11-17 | End: 2023-10-24

## 2022-11-17 RX ORDER — BISOPROLOL FUMARATE AND HYDROCHLOROTHIAZIDE 2.5; 6.25 MG/1; MG/1
1 TABLET ORAL DAILY
Qty: 90 TABLET | Refills: 3 | Status: SHIPPED | OUTPATIENT
Start: 2022-11-17 | End: 2023-12-27

## 2022-11-17 ASSESSMENT — ENCOUNTER SYMPTOMS
PALPITATIONS: 0
NERVOUS/ANXIOUS: 0
HEMATURIA: 0
ARTHRALGIAS: 0
EYE PAIN: 0
HEMATOCHEZIA: 0
BREAST MASS: 0
SORE THROAT: 0
NAUSEA: 0
MYALGIAS: 0
ABDOMINAL PAIN: 0
JOINT SWELLING: 0
HEARTBURN: 0
HEADACHES: 0
SHORTNESS OF BREATH: 0
COUGH: 0
DIZZINESS: 0
WEAKNESS: 0
FEVER: 0
DIARRHEA: 0
PARESTHESIAS: 0
DYSURIA: 0
FREQUENCY: 0
CHILLS: 0
CONSTIPATION: 0

## 2022-11-17 ASSESSMENT — ACTIVITIES OF DAILY LIVING (ADL): CURRENT_FUNCTION: NO ASSISTANCE NEEDED

## 2022-11-17 NOTE — PATIENT INSTRUCTIONS
You do not require treatment, other than the usual things we should all do for bone health.      1.  CALCIUM Recommendation 1200mg/day in divided doses of no more than 400-500 mg/dose. This includes both what is in your food AND what is in any supplements you are taking.  Read the labels carefully.    Some Dietary sources of calcium: These also contain vitamin D  Milk                            8 oz            300 mg  Yogurt                          1 cup           400 mg  Hard cheese                     1.5 oz          300 mg  Cottage cheese                  2 cup           300 mg  Orange juice with Calcium       8 oz            300 mg  Low fat dairy sources are recommended    2.  Adequate vitamin D    Recommended vitamin D intake  Over age 50: 800-1000 IU/day  Safe upper limit for vitamin D 4000 IU/day      Good dietary sources of vitamin D:  Fatty fish  liver  Egg yolks  Vitamin D fortified milk, juices, cereals    3.  Bone health exercise  A) 30 minutes of moderate exercise on most days of the week   Weight bearing exercise (ie, walking, jogging, hiking, dancing)    B) Strength training 2 or more times/week in addition to other weight -being exercise.    Strength training  uses weight or resistance beyond that seen in everyday activities -(pilates, weight training with free weights, weight machines or resistance bands)    Weight supporting activities such as water aerobics, floor exercises and stationary cycling may be more appropriate for some patients with compromised bone health    Spinal fractures: AVOID activities that place significant force on the spine such as horse back riding,  tennis, golf or bowling    For OSTEOPOROSIS of the spine: avoid exercise machines that incorporate spinal flexion, trunk rotation, or forward bending   Specifically avoid abdominal exercisers, stationary bikes with moving handles, cross-country ski machines, rowing machines, and bicep curl machines

## 2022-11-17 NOTE — PROGRESS NOTES
"SUBJECTIVE:   Serena is a 72 year old who presents for Preventive Visit via video visit today   Patient has been advised of split billing requirements and indicates understanding: Yes  Are you in the first 12 months of your Medicare coverage?  No    Healthy Habits:     In general, how would you rate your overall health?  Good    Frequency of exercise:  2-3 days/week    Duration of exercise:  15-30 minutes    Do you usually eat at least 4 servings of fruit and vegetables a day, include whole grains    & fiber and avoid regularly eating high fat or \"junk\" foods?  Yes    Taking medications regularly:  Yes    Medication side effects:  None    Ability to successfully perform activities of daily living:  No assistance needed    Home Safety:  No safety concerns identified    Hearing Impairment:  No hearing concerns    In the past 6 months, have you been bothered by leaking of urine?  No    In general, how would you rate your overall mental or emotional health?  Excellent      PHQ-2 Total Score: 0    Additional concerns today:  No      Have you ever done Advance Care Planning? (For example, a Health Directive, POLST, or a discussion with a medical provider or your loved ones about your wishes): Yes, advance care planning is on file.       Fall risk  Fallen 2 or more times in the past year?: No  Any fall with injury in the past year?: No  click delete button to remove this line now  Cognitive Screening Unable to complete due to virtual visit; need for additional assessment in future face-to-face visit    Do you have sleep apnea, excessive snoring or daytime drowsiness?: no    Reviewed and updated as needed this visit by clinical staff    Allergies               Reviewed and updated as needed this visit by Provider                 Social History     Tobacco Use     Smoking status: Former     Packs/day: 0.50     Years: 7.00     Pack years: 3.50     Types: Cigarettes     Start date: 9/1/1970     Quit date: 9/1/1977     Years " since quittin.2     Smokeless tobacco: Never   Substance Use Topics     Alcohol use: Yes     Comment: 5-7 glasses wine per week     If you drink alcohol do you typically have >3 drinks per day or >7 drinks per week? No    Alcohol Use 2022   Prescreen: >3 drinks/day or >7 drinks/week? -   Prescreen: >3 drinks/day or >7 drinks/week? No             Current providers sharing in care for this patient include:   Patient Care Team:  Zoya Elizabeth MD as PCP - General (Family Practice)  Zoya Elizabeth MD as Assigned PCP    The following health maintenance items are reviewed in Epic and correct as of today:  Health Maintenance   Topic Date Due     ZOSTER IMMUNIZATION (2 of 3) 2014     COVID-19 Vaccine (5 - Booster for Pfizer series) 06/15/2022     COLORECTAL CANCER SCREENING  10/19/2022     BMP  2022     LIPID  2022     ANNUAL REVIEW OF HM ORDERS  2022     MEDICARE ANNUAL WELLNESS VISIT  2023     FALL RISK ASSESSMENT  2023     MAMMO SCREENING  2024     ADVANCE CARE PLANNING  2027     DTAP/TDAP/TD IMMUNIZATION (2 - Td or Tdap) 2028     DEXA  2034     HEPATITIS C SCREENING  Completed     PHQ-2 (once per calendar year)  Completed     INFLUENZA VACCINE  Completed     Pneumococcal Vaccine: 65+ Years  Completed     IPV IMMUNIZATION  Aged Out     MENINGITIS IMMUNIZATION  Aged Out          Review of Systems   Constitutional: Negative for chills and fever.   HENT: Negative for congestion, ear pain, hearing loss and sore throat.    Eyes: Negative for pain and visual disturbance.   Respiratory: Negative for cough and shortness of breath.    Cardiovascular: Negative for chest pain, palpitations and peripheral edema.   Gastrointestinal: Negative for abdominal pain, constipation, diarrhea, heartburn, hematochezia and nausea.   Breasts:  Negative for tenderness, breast mass and discharge.   Genitourinary: Negative for dysuria, frequency, genital sores, hematuria,  "pelvic pain, urgency, vaginal bleeding and vaginal discharge.   Musculoskeletal: Negative for arthralgias, joint swelling and myalgias.   Skin: Negative for rash.   Neurological: Negative for dizziness, weakness, headaches and paresthesias.   Psychiatric/Behavioral: Negative for mood changes. The patient is not nervous/anxious.           OBJECTIVE:   There were no vitals taken for this visit. Estimated body mass index is 32.62 kg/m  as calculated from the following:    Height as of 11/9/21: 1.651 m (5' 5\").    Weight as of 11/9/21: 88.9 kg (196 lb).   No vitals completed due to video visit  Physical Exam  GENERAL: healthy, alert and no distress    Diagnostic Test Results:  Labs reviewed in Epic    ASSESSMENT / PLAN:       ICD-10-CM    1. Encounter for Medicare annual wellness exam  Z00.00       2. Hyperlipidemia LDL goal <130  E78.5 Lipid panel reflex to direct LDL Fasting     Comprehensive metabolic panel (BMP + Alb, Alk Phos, ALT, AST, Total. Bili, TP)     atorvastatin (LIPITOR) 20 MG tablet      3. Hypertension goal BP (blood pressure) < 140/90  I10 Comprehensive metabolic panel (BMP + Alb, Alk Phos, ALT, AST, Total. Bili, TP)     Albumin Random Urine Quantitative with Creat Ratio     bisoprolol-hydrochlorothiazide (ZIAC) 2.5-6.25 MG tablet      4. Osteopenia of left hip  M85.852       5. Screening for colon cancer  Z12.11       6. Screening mammogram, encounter for  Z12.31 MA Screening Digital Bilateral      7. Post-menopausal  Z78.0 DX Hip/Pelvis/Spine        Encounter for routine adult health examination without abnormal findings  Healthy   I recommended scheduling mammogram. Last was 1/22  Completed FIT test in 10/22 and it was normal.   Shingrix vaccine was recommended - she has an appt scheduled  Flu shot completed for the season  COVID vaccine -- recommended bivalent COVID vaccine - plans to get in January due to recent COVID infection     Hypertension goal BP (blood pressure) < 140/90  No medication " changes today. CMP and urine albumin for monitoring.   I recommended checking her bp at home periodically.   Continues bisoprolol--hydrochlorothiazide 2.5-6.25 mg daily. Schedule lab visit  - bisoprolol-hydrochlorothiazide (ZIAC) 2.5-6.25 MG tablet; TAKE 1 TABLET EVERY DAY  Dispense: 90 tablet; Refill: 3  -CMP  - Albumin Random Urine Quantitative with Creat Ratio     Hyperlipidemia LDL goal <130  stable.   Continues atorvastatin 20 mg daily.  CMP and lipids for medication monitoring     Colon cancer screening  She completed a FIT test on 10/17/22 and it was normal. She will send [facility] with results.      Osteopenia, left hip  DEXA completed 12/2019 showing left hip osteopenia. Repeat DEXA in 12/2022 (ojkay to wait until our new clinic opens in March and complete DEXA at that time)  FRAX 10-YEAR PROBABILITY OF FRACTURE*:  Major Osteoporotic: 8%  Hip: 1%      Cold sore -- not discussed today   per previous wellness visit notes: Occasional cold sore.  She had previously used acyclovir ointment, however due to the expense she prefers not to use medication.  She typically gets about 1 cold sore per year.         COUNSELING:  Reviewed preventive health counseling, as reflected in patient instructions        She reports that she quit smoking about 45 years ago. Her smoking use included cigarettes. She started smoking about 52 years ago. She has a 3.50 pack-year smoking history. She has never used smokeless tobacco.      Appropriate preventive services were discussed with this patient, including applicable screening as appropriate for cardiovascular disease, diabetes, osteopenia/osteoporosis, and glaucoma.  As appropriate for age/gender, discussed screening for colorectal cancer, prostate cancer, breast cancer, and cervical cancer. Checklist reviewing preventive services available has been given to the patient.    Reviewed patients plan of care and provided an AVS. The Intermediate Care Plan ( asthma action plan, low back  pain action plan, and migraine action plan) for Serena meets the Care Plan requirement. This Care Plan has been established and reviewed with the Patient.       Zoya Elizabeth MD, MD  [facility]    Video visit using [employer]  Visit start time:9:00 AM  Visit end time: 9:22 AM   Location: off-site    Identified Health Risks:

## 2022-11-29 ENCOUNTER — LAB (OUTPATIENT)
Dept: LAB | Facility: CLINIC | Age: 72
End: 2022-11-29
Payer: COMMERCIAL

## 2022-11-29 DIAGNOSIS — E78.5 HYPERLIPIDEMIA LDL GOAL <130: ICD-10-CM

## 2022-11-29 DIAGNOSIS — I10 HYPERTENSION GOAL BP (BLOOD PRESSURE) < 140/90: ICD-10-CM

## 2022-11-29 LAB
CHOLEST SERPL-MCNC: 193 MG/DL
CREAT UR-MCNC: 70.8 MG/DL
HDLC SERPL-MCNC: 81 MG/DL
LDLC SERPL CALC-MCNC: 99 MG/DL
MICROALBUMIN UR-MCNC: 15.7 MG/L
MICROALBUMIN/CREAT UR: 22.18 MG/G CR (ref 0–25)
NONHDLC SERPL-MCNC: 112 MG/DL
TRIGL SERPL-MCNC: 65 MG/DL

## 2022-11-29 PROCEDURE — 80061 LIPID PANEL: CPT

## 2022-11-29 PROCEDURE — 36415 COLL VENOUS BLD VENIPUNCTURE: CPT

## 2022-11-29 PROCEDURE — 82043 UR ALBUMIN QUANTITATIVE: CPT

## 2022-11-29 PROCEDURE — 80053 COMPREHEN METABOLIC PANEL: CPT

## 2022-11-30 LAB
ALBUMIN SERPL BCG-MCNC: 4.7 G/DL (ref 3.5–5.2)
ALP SERPL-CCNC: 47 U/L (ref 35–104)
ALT SERPL W P-5'-P-CCNC: 27 U/L (ref 10–35)
ANION GAP SERPL CALCULATED.3IONS-SCNC: 11 MMOL/L (ref 7–15)
AST SERPL W P-5'-P-CCNC: 24 U/L (ref 10–35)
BILIRUB SERPL-MCNC: 0.7 MG/DL
BUN SERPL-MCNC: 15.1 MG/DL (ref 8–23)
CALCIUM SERPL-MCNC: 9.5 MG/DL (ref 8.8–10.2)
CHLORIDE SERPL-SCNC: 101 MMOL/L (ref 98–107)
CREAT SERPL-MCNC: 0.98 MG/DL (ref 0.51–0.95)
DEPRECATED HCO3 PLAS-SCNC: 29 MMOL/L (ref 22–29)
GFR SERPL CREATININE-BSD FRML MDRD: 61 ML/MIN/1.73M2
GLUCOSE SERPL-MCNC: 102 MG/DL (ref 70–99)
POTASSIUM SERPL-SCNC: 3.9 MMOL/L (ref 3.4–5.3)
PROT SERPL-MCNC: 7.2 G/DL (ref 6.4–8.3)
SODIUM SERPL-SCNC: 141 MMOL/L (ref 136–145)

## 2022-11-30 NOTE — RESULT ENCOUNTER NOTE
The results of your recent lipid (cholesterol) profile were improved.    Here are the results:  Lab Results       Component                Value               Date                       CHOL                     193                 11/29/2022                 CHOL                     191                 12/19/2019            Lab Results       Component                Value               Date                       HDL                      81                  11/29/2022                 HDL                      74                  12/19/2019            Lab Results       Component                Value               Date                       LDL                      99                  11/29/2022                 LDL                      101                 12/19/2019            Lab Results       Component                Value               Date                       TRIG                     65                  11/29/2022                 TRIG                     81                  12/19/2019            Lab Results       Component                Value               Date                       CHOLHDLRATIO             2.7                 10/16/2015              Desired or goal levels are:  CHOLESTEROL: Desirable is less than 200.   HDL (Good Cholesterol): Desirable is greater than 40 (for men) greater than 50 (for women).  LDL (Bad Cholesterol): Desirable is less than 130 (or less than 100 if you have heart disease or diabetes). Borderline 130-160.  TRIGLYCERIDES: Desirable is less than 150.  Borderline is 150-200.    Keep up the good work!.    As you may know, an elevated cholesterol is one factor that increases your risk for heart disease and stroke. You can improve your cholesterol by controlling the amount and type of fat you eat and by increasing your daily activity level.    Here are some ways to improve your nutrition:  Eat less fat (especially butter, Crisco and other saturated fats)  Buy lean cuts of meat, reduce  your portions of red meat or substitute poultry or fish  Use skim milk and low-fat dairy products  Eat no more than 4 egg yolks per week  Avoid fried or fast foods that are high in fat  Eat more fruits and vegetables      Also consider starting or increasing your aerobic activity. Aerobic activity is the best way to improve HDL (good) cholesterol. If this would be new to you, please talk with me first about what activities are safe for you.      Other lab results:    Your other lab results were stable.     Please feel free to contact us with any questions or if you would like more information.    Zoya Elizabeth M.D.

## 2023-01-13 ENCOUNTER — ANCILLARY PROCEDURE (OUTPATIENT)
Dept: MAMMOGRAPHY | Facility: CLINIC | Age: 73
End: 2023-01-13
Attending: FAMILY MEDICINE
Payer: COMMERCIAL

## 2023-01-13 DIAGNOSIS — Z12.31 SCREENING MAMMOGRAM, ENCOUNTER FOR: ICD-10-CM

## 2023-01-13 DIAGNOSIS — Z12.31 VISIT FOR SCREENING MAMMOGRAM: ICD-10-CM

## 2023-01-13 PROCEDURE — 77067 SCR MAMMO BI INCL CAD: CPT | Mod: TC | Performed by: RADIOLOGY

## 2023-01-13 PROCEDURE — 77063 BREAST TOMOSYNTHESIS BI: CPT | Mod: TC | Performed by: RADIOLOGY

## 2023-06-01 ENCOUNTER — ANCILLARY PROCEDURE (OUTPATIENT)
Dept: GENERAL RADIOLOGY | Facility: CLINIC | Age: 73
End: 2023-06-01
Attending: PHYSICIAN ASSISTANT
Payer: COMMERCIAL

## 2023-06-01 ENCOUNTER — OFFICE VISIT (OUTPATIENT)
Dept: URGENT CARE | Facility: URGENT CARE | Age: 73
End: 2023-06-01
Payer: COMMERCIAL

## 2023-06-01 VITALS
DIASTOLIC BLOOD PRESSURE: 86 MMHG | WEIGHT: 215 LBS | SYSTOLIC BLOOD PRESSURE: 154 MMHG | OXYGEN SATURATION: 96 % | HEART RATE: 73 BPM | TEMPERATURE: 98.6 F | BODY MASS INDEX: 35.78 KG/M2

## 2023-06-01 DIAGNOSIS — E66.01 MORBID OBESITY (H): ICD-10-CM

## 2023-06-01 DIAGNOSIS — M43.16 SPONDYLOLISTHESIS OF LUMBAR REGION: ICD-10-CM

## 2023-06-01 DIAGNOSIS — M70.62 TROCHANTERIC BURSITIS OF LEFT HIP: Primary | ICD-10-CM

## 2023-06-01 DIAGNOSIS — M47.816 SPONDYLOSIS OF LUMBAR REGION WITHOUT MYELOPATHY OR RADICULOPATHY: ICD-10-CM

## 2023-06-01 PROCEDURE — 73502 X-RAY EXAM HIP UNI 2-3 VIEWS: CPT | Mod: TC | Performed by: RADIOLOGY

## 2023-06-01 PROCEDURE — 99214 OFFICE O/P EST MOD 30 MIN: CPT | Performed by: PHYSICIAN ASSISTANT

## 2023-06-01 PROCEDURE — 72100 X-RAY EXAM L-S SPINE 2/3 VWS: CPT | Mod: TC | Performed by: RADIOLOGY

## 2023-06-01 ASSESSMENT — ENCOUNTER SYMPTOMS
FEVER: 0
BACK PAIN: 1
DYSURIA: 0

## 2023-06-01 NOTE — PROGRESS NOTES
SUBJECTIVE:   Serena Reno is a 72 year old female presenting with a chief complaint of   Chief Complaint   Patient presents with     Back Pain     Radiating down left leg x week        She is an established patient of [facility].   Patient's last dexa scan on 12/23/19 indicated osteoporosis.  Patient presents with left lower back pain that radiates down left leg to knee.  States the pain is lateral and sometimes anterior. States she was gardening recently, carrying pots of plants. No known back problems.  No dysuria,  No fevers.    Treatment:  2 ibuprofen q 4 hours - helps but still painful.      Review of Systems   Constitutional: Negative for fever.   Genitourinary: Negative for dysuria.   Musculoskeletal: Positive for back pain.   All other systems reviewed and are negative.      Past Medical History:   Diagnosis Date     Borderline abnormal thyroid function test 7/30/2012     Hyperlipidemia LDL goal < 130      Hypertension goal BP (blood pressure) < 140/90      Family History   Problem Relation Age of Onset     Cerebrovascular Disease Mother         Complication of heart valve replacement     Other Cancer Mother         Uterine     Osteoporosis Mother      Coronary Artery Disease Father      Hypertension Father      Hyperlipidemia Father      Obesity Father      Hypertension Sister      Hyperlipidemia Sister      Thyroid Disease Sister      Obesity Sister      Hypertension Sister      Obesity Sister      Hypertension Brother      Hyperlipidemia Brother      Thyroid Disease Daughter      Thyroid Disease Daughter      Current Outpatient Medications   Medication Sig Dispense Refill     Ascorbic Acid (VITAMIN C) 500 MG CAPS Take 1 capsule by mouth daily.       atorvastatin (LIPITOR) 20 MG tablet Take 1 tablet (20 mg) by mouth daily 90 tablet 3     bisoprolol-hydrochlorothiazide (ZIAC) 2.5-6.25 MG tablet Take 1 tablet by mouth daily 90 tablet 3     Calcium Citrate-Vitamin D (CALCIUM CITRATE+D3 PETITES PO)         co-enzyme Q-10 100 MG CAPS capsule Take 1 capsule by mouth daily.       Glucosamine-Chondroit-Vit C-Mn (GLUCOSAMINE CHONDR 1500 COMPLX PO) Take 2 capsules by mouth daily        Multiple Vitamin (MULTIVITAMINS PO) Take 1 capsule by mouth daily. Adult 50 plus       Multiple Vitamins-Minerals (PRESERVISION AREDS 2) CAPS Take 2 capsules by mouth daily.       omega-3 fatty acids 1200 MG capsule Take 1 capsule by mouth daily.       tiZANidine (ZANAFLEX) 4 MG tablet Take 1 tablet (4 mg) by mouth 3 times daily 30 tablet 0     vitamin E 400 UNIT capsule Take 1 capsule by mouth daily.       Social History     Tobacco Use     Smoking status: Former     Packs/day: 0.50     Years: 7.00     Pack years: 3.50     Types: Cigarettes     Start date: 1970     Quit date: 1977     Years since quittin.7     Smokeless tobacco: Never   Vaping Use     Vaping status: Not on file   Substance Use Topics     Alcohol use: Yes     Comment: 5-7 glasses wine per week       OBJECTIVE  BP (!) 154/86   Pulse 73   Temp 98.6  F (37  C) (Tympanic)   Wt 97.5 kg (215 lb)   SpO2 96%   BMI 35.78 kg/m      Physical Exam  Vitals and nursing note reviewed.   Constitutional:       Appearance: Normal appearance. She is obese.   Eyes:      Extraocular Movements: Extraocular movements intact.      Conjunctiva/sclera: Conjunctivae normal.   Cardiovascular:      Rate and Rhythm: Normal rate.   Musculoskeletal:      Comments: Patient is able to ambulate, heel walk, toe walk, flex, extend, side flex and twist normally.  SLR negative strength in lower extremities normal.  Warmth to extremities.  Skin over back normal.  No tenderness to bony spine.  Left PSIS tender to palpation.  Bilateral hip ROM normal.  Left trochanteric bursa tender to palpation.         Neurological:      Mental Status: She is alert.         Labs:  Results for orders placed or performed in visit on 23 (from the past 24 hour(s))   XR Hip Left 2-3 Views    Narrative    XR HIP  LEFT 2-3 VIEWS   6/1/2023 10:30 AM     HISTORY: Trochanteric bursitis of left hip  COMPARISON: None.       Impression    IMPRESSION: Normal left hip joint space and alignment. There are  degenerative changes in the left sacroiliac joint. No fracture.    ANGELLA PORRAS MD         SYSTEM ID:  [id number]   XR Lumbar Spine 2/3 Views    Narrative    LUMBAR SPINE TWO TO THREE VIEWS 6/1/2023 10:30 AM     COMPARISON: None.    HISTORY: Trochanteric bursitis of left hip.      Impression    IMPRESSION: Five lumbar type vertebrae. Grade 1 degenerative  anterolisthesis of L4 upon L5. Alignment otherwise normal. Vertebral  body heights normal. No fractures. Facet arthropathy throughout the  lumbar spine.     LEYDI JASSO MD         SYSTEM ID:  [id number]       X-Ray was done, my findings are: L4-5 spondylolisthesis, DJD    ASSESSMENT:      ICD-10-CM    1. Trochanteric bursitis of left hip  M70.62 XR Lumbar Spine 2/3 Views     XR Hip Left 2-3 Views     tiZANidine (ZANAFLEX) 4 MG tablet     Physical Therapy Referral      2. Morbid obesity (H)  E66.01       3. Spondylolisthesis of lumbar region  M43.16 Physical Therapy Referral      4. Spondylosis of lumbar region without myelopathy or radiculopathy  M47.816 Physical Therapy Referral           Medical Decision Making:    Differential Diagnosis:  MS Injury Pain: muscle strain, bursitis and osteoarthritis    Serious Comorbid Conditions:  Adult:  reviewed    PLAN:     Discussed all radiographs.  Discussed treatment options and patient would like to take her own ibuprofen and tylenol.  She declined referral to ortho and spine.  Rx for PT.  No activities that aggrivate.  Ice to bursa.      Followup:    If not improving or if condition worsens, follow up with your Primary Care Provider, If not improving or if conditions worsens over the next 12-24 hours, go to the Emergency Department    There are no Patient Instructions on file for this visit.

## 2023-10-18 ENCOUNTER — PATIENT OUTREACH (OUTPATIENT)
Dept: CARE COORDINATION | Facility: CLINIC | Age: 73
End: 2023-10-18
Payer: COMMERCIAL

## 2023-10-24 DIAGNOSIS — E78.5 HYPERLIPIDEMIA LDL GOAL <130: ICD-10-CM

## 2023-10-25 RX ORDER — ATORVASTATIN CALCIUM 20 MG/1
20 TABLET, FILM COATED ORAL DAILY
Qty: 90 TABLET | Refills: 0 | Status: SHIPPED | OUTPATIENT
Start: 2023-10-25 | End: 2023-12-27

## 2023-11-01 ENCOUNTER — PATIENT OUTREACH (OUTPATIENT)
Dept: CARE COORDINATION | Facility: CLINIC | Age: 73
End: 2023-11-01
Payer: COMMERCIAL

## 2023-11-06 ENCOUNTER — TRANSFERRED RECORDS (OUTPATIENT)
Dept: FAMILY MEDICINE | Facility: CLINIC | Age: 73
End: 2023-11-06

## 2023-12-27 ENCOUNTER — MYC REFILL (OUTPATIENT)
Dept: FAMILY MEDICINE | Facility: CLINIC | Age: 73
End: 2023-12-27
Payer: COMMERCIAL

## 2023-12-27 DIAGNOSIS — E78.5 HYPERLIPIDEMIA LDL GOAL <130: ICD-10-CM

## 2023-12-27 DIAGNOSIS — I10 HYPERTENSION GOAL BP (BLOOD PRESSURE) < 140/90: ICD-10-CM

## 2023-12-27 ASSESSMENT — ENCOUNTER SYMPTOMS
DIZZINESS: 0
SORE THROAT: 0
EYE PAIN: 0
DYSURIA: 0
PALPITATIONS: 0
COUGH: 0
CONSTIPATION: 0
DIARRHEA: 0
HEMATOCHEZIA: 0
PARESTHESIAS: 0
WEAKNESS: 0
NERVOUS/ANXIOUS: 0
BREAST MASS: 0
FEVER: 0
FREQUENCY: 0
NAUSEA: 0
JOINT SWELLING: 0
SHORTNESS OF BREATH: 0
HEARTBURN: 0
CHILLS: 0
ABDOMINAL PAIN: 0
MYALGIAS: 0
HEMATURIA: 0
ARTHRALGIAS: 1
HEADACHES: 0

## 2023-12-27 ASSESSMENT — ACTIVITIES OF DAILY LIVING (ADL): CURRENT_FUNCTION: NO ASSISTANCE NEEDED

## 2023-12-28 RX ORDER — ATORVASTATIN CALCIUM 20 MG/1
20 TABLET, FILM COATED ORAL DAILY
Qty: 90 TABLET | Refills: 0 | Status: SHIPPED | OUTPATIENT
Start: 2023-12-28 | End: 2024-01-03

## 2023-12-28 RX ORDER — BISOPROLOL FUMARATE AND HYDROCHLOROTHIAZIDE 2.5; 6.25 MG/1; MG/1
1 TABLET ORAL DAILY
Qty: 90 TABLET | Refills: 3 | Status: SHIPPED | OUTPATIENT
Start: 2023-12-28 | End: 2024-01-03

## 2024-01-03 ENCOUNTER — OFFICE VISIT (OUTPATIENT)
Dept: FAMILY MEDICINE | Facility: CLINIC | Age: 74
End: 2024-01-03
Attending: FAMILY MEDICINE
Payer: COMMERCIAL

## 2024-01-03 ENCOUNTER — LAB (OUTPATIENT)
Dept: FAMILY MEDICINE | Facility: CLINIC | Age: 74
End: 2024-01-03

## 2024-01-03 VITALS
OXYGEN SATURATION: 100 % | DIASTOLIC BLOOD PRESSURE: 80 MMHG | TEMPERATURE: 97.1 F | HEART RATE: 69 BPM | SYSTOLIC BLOOD PRESSURE: 132 MMHG | HEIGHT: 65 IN | RESPIRATION RATE: 18 BRPM | BODY MASS INDEX: 36.25 KG/M2 | WEIGHT: 217.6 LBS

## 2024-01-03 DIAGNOSIS — Z29.11 NEED FOR VACCINATION AGAINST RESPIRATORY SYNCYTIAL VIRUS: ICD-10-CM

## 2024-01-03 DIAGNOSIS — I10 HYPERTENSION GOAL BP (BLOOD PRESSURE) < 140/90: ICD-10-CM

## 2024-01-03 DIAGNOSIS — E78.5 HYPERLIPIDEMIA LDL GOAL <130: ICD-10-CM

## 2024-01-03 DIAGNOSIS — Z12.11 SCREENING FOR COLON CANCER: ICD-10-CM

## 2024-01-03 DIAGNOSIS — Z00.00 ENCOUNTER FOR MEDICARE ANNUAL WELLNESS EXAM: Primary | ICD-10-CM

## 2024-01-03 LAB
ALBUMIN SERPL BCG-MCNC: 4.8 G/DL (ref 3.5–5.2)
ALP SERPL-CCNC: 45 U/L (ref 40–150)
ALT SERPL W P-5'-P-CCNC: 25 U/L (ref 0–50)
ANION GAP SERPL CALCULATED.3IONS-SCNC: 11 MMOL/L (ref 7–15)
AST SERPL W P-5'-P-CCNC: 30 U/L (ref 0–45)
BILIRUB SERPL-MCNC: 0.6 MG/DL
BUN SERPL-MCNC: 19 MG/DL (ref 8–23)
CALCIUM SERPL-MCNC: 10.1 MG/DL (ref 8.8–10.2)
CHLORIDE SERPL-SCNC: 100 MMOL/L (ref 98–107)
CHOLEST SERPL-MCNC: 222 MG/DL
CREAT SERPL-MCNC: 0.89 MG/DL (ref 0.51–0.95)
CREAT UR-MCNC: 37.1 MG/DL
DEPRECATED HCO3 PLAS-SCNC: 28 MMOL/L (ref 22–29)
EGFRCR SERPLBLD CKD-EPI 2021: 68 ML/MIN/1.73M2
FASTING STATUS PATIENT QL REPORTED: ABNORMAL
GLUCOSE SERPL-MCNC: 98 MG/DL (ref 70–99)
HDLC SERPL-MCNC: 77 MG/DL
LDLC SERPL CALC-MCNC: 125 MG/DL
MICROALBUMIN UR-MCNC: <12 MG/L
MICROALBUMIN/CREAT UR: NORMAL MG/G{CREAT}
NONHDLC SERPL-MCNC: 145 MG/DL
POTASSIUM SERPL-SCNC: 3.9 MMOL/L (ref 3.4–5.3)
PROT SERPL-MCNC: 7.3 G/DL (ref 6.4–8.3)
SODIUM SERPL-SCNC: 139 MMOL/L (ref 135–145)
TRIGL SERPL-MCNC: 101 MG/DL

## 2024-01-03 PROCEDURE — G0439 PPPS, SUBSEQ VISIT: HCPCS | Performed by: FAMILY MEDICINE

## 2024-01-03 PROCEDURE — 82570 ASSAY OF URINE CREATININE: CPT | Performed by: FAMILY MEDICINE

## 2024-01-03 PROCEDURE — 82043 UR ALBUMIN QUANTITATIVE: CPT | Performed by: FAMILY MEDICINE

## 2024-01-03 PROCEDURE — 36415 COLL VENOUS BLD VENIPUNCTURE: CPT | Performed by: FAMILY MEDICINE

## 2024-01-03 PROCEDURE — 80061 LIPID PANEL: CPT | Performed by: FAMILY MEDICINE

## 2024-01-03 PROCEDURE — 99214 OFFICE O/P EST MOD 30 MIN: CPT | Mod: 25 | Performed by: FAMILY MEDICINE

## 2024-01-03 PROCEDURE — 80053 COMPREHEN METABOLIC PANEL: CPT | Performed by: FAMILY MEDICINE

## 2024-01-03 RX ORDER — RESPIRATORY SYNCYTIAL VIRUS VACCINE 120MCG/0.5
0.5 KIT INTRAMUSCULAR ONCE
Qty: 1 EACH | Refills: 0 | Status: CANCELLED | OUTPATIENT
Start: 2024-01-03 | End: 2024-01-03

## 2024-01-03 RX ORDER — ATORVASTATIN CALCIUM 20 MG/1
20 TABLET, FILM COATED ORAL DAILY
Qty: 90 TABLET | Refills: 3 | Status: SHIPPED | OUTPATIENT
Start: 2024-01-03 | End: 2024-01-10 | Stop reason: DRUGHIGH

## 2024-01-03 RX ORDER — BISOPROLOL FUMARATE AND HYDROCHLOROTHIAZIDE 2.5; 6.25 MG/1; MG/1
1 TABLET ORAL DAILY
Qty: 90 TABLET | Refills: 3 | Status: SHIPPED | OUTPATIENT
Start: 2024-01-03

## 2024-01-03 ASSESSMENT — ENCOUNTER SYMPTOMS
CHILLS: 0
ABDOMINAL PAIN: 0
SORE THROAT: 0
HEMATURIA: 0
DIZZINESS: 0
BREAST MASS: 0
JOINT SWELLING: 0
WEAKNESS: 0
PALPITATIONS: 0
PARESTHESIAS: 0
MYALGIAS: 0
COUGH: 0
HEARTBURN: 0
HEADACHES: 0
HEMATOCHEZIA: 0
FEVER: 0
FREQUENCY: 0
EYE PAIN: 0
ARTHRALGIAS: 1
DYSURIA: 0
DIARRHEA: 0
CONSTIPATION: 0
SHORTNESS OF BREATH: 0
NERVOUS/ANXIOUS: 0
NAUSEA: 0

## 2024-01-03 ASSESSMENT — PAIN SCALES - GENERAL: PAINLEVEL: NO PAIN (0)

## 2024-01-03 ASSESSMENT — ACTIVITIES OF DAILY LIVING (ADL): CURRENT_FUNCTION: NO ASSISTANCE NEEDED

## 2024-01-03 NOTE — PATIENT INSTRUCTIONS
1. I recommend getting the RSV vaccine at a local pharmacy.   2. Schedule a bone density test at [facility].   3. Work on getting strength training two days a week   4. Work on balance exercises, Samm Chi, Pilates or other kind of exercise to help with balance.     Things we should all do for bone health:    1.  CALCIUM Recommendation 1200mg/day in divided doses of no more than 400-500 mg/dose. This includes both what is in your food AND what is in any supplements you are taking.  Read the labels carefully.    Some Dietary sources of calcium: These also contain vitamin D  Milk                            8 oz            300 mg  Yogurt                          1 cup           400 mg  Hard cheese                     1.5 oz          300 mg  Cottage cheese                  2 cup           300 mg  Orange juice with Calcium       8 oz            300 mg  Low fat dairy sources are recommended    2.  Adequate vitamin D    Recommended vitamin D intake  Over age 50: 800-1000 IU/day  Safe upper limit for vitamin D 4000 IU/day      Good dietary sources of vitamin D:  Fatty fish  liver  Egg yolks  Vitamin D fortified milk, juices, cereals    3.  Bone health exercise  A) 30 minutes of moderate exercise on most days of the week   Weight bearing exercise (ie, walking, jogging, hiking, dancing)    B) Strength training 2 or more times/week in addition to other weight -being exercise.    Strength training  uses weight or resistance beyond that seen in everyday activities -(pilates, weight training with free weights, weight machines or resistance bands)    Weight supporting activities such as water aerobics, floor exercises and stationary cycling may be more appropriate for some patients with compromised bone health    Spinal fractures: AVOID activities that place significant force on the spine such as horse back riding,  tennis, golf or bowling    For OSTEOPOROSIS of the spine: avoid exercise machines that incorporate spinal flexion,  trunk rotation, or forward bending   Specifically avoid abdominal exercisers, stationary bikes with moving handles, cross-country ski machines, rowing machines, and bicep curl machines         Patient Education   Personalized Prevention Plan  You are due for the preventive services outlined below.  Your care team is available to assist you in scheduling these services.  If you have already completed any of these items, please share that information with your care team to update in your medical record.  Health Maintenance Due   Topic Date Due    RSV VACCINE (Pregnancy & 60+) (1 - 1-dose 60+ series) Never done    ANNUAL REVIEW OF HM ORDERS  11/09/2022    Colorectal Cancer Screening  10/17/2023    Annual Wellness Visit  11/17/2023    Basic Metabolic Panel  11/29/2023    Cholesterol Lab  11/29/2023

## 2024-01-03 NOTE — PROGRESS NOTES
"SUBJECTIVE:   Serena is a 73 year old, presenting for the following:  Medicare Visit        1/3/2024    12:33 PM   Additional Questions   Roomed by Michaelle   Accompanied by Self         1/3/2024    12:33 PM   Patient Reported Additional Medications   Patient reports taking the following new medications None       Are you in the first 12 months of your Medicare coverage?  No    Healthy Habits:     In general, how would you rate your overall health?  Good    Frequency of exercise:  1 day/week    Duration of exercise:  30-45 minutes    Do you usually eat at least 4 servings of fruit and vegetables a day, include whole grains    & fiber and avoid regularly eating high fat or \"junk\" foods?  Yes    Taking medications regularly:  Yes    Medication side effects:  None    Ability to successfully perform activities of daily living:  No assistance needed    Home Safety:  Lack of grab bars in the bathroom    Hearing Impairment:  No hearing concerns    In the past 6 months, have you been bothered by leaking of urine?  No    In general, how would you rate your overall mental or emotional health?  Excellent    Additional concerns today:  No      Today's PHQ-2 Score:       1/3/2024    12:18 PM   PHQ-2 ( 1999 Pfizer)   Q1: Little interest or pleasure in doing things 0   Q2: Feeling down, depressed or hopeless 0   PHQ-2 Score 0   Q1: Little interest or pleasure in doing things Not at all   Q2: Feeling down, depressed or hopeless Not at all   PHQ-2 Score 0           Have you ever done Advance Care Planning? (For example, a Health Directive, POLST, or a discussion with a medical provider or your loved ones about your wishes): Yes, advance care planning is on file.     Fall risk  Fallen 2 or more times in the past year?: No  Any fall with injury in the past year?: No    Cognitive Screening   1) Repeat 3 items (Leader, Season, Table)    2) Clock draw: NORMAL  3) 3 item recall: Recalls 3 objects  Results: 3 items recalled: COGNITIVE " IMPAIRMENT LESS LIKELY    Mini-CogTM Copyright MANDI So. Licensed by the author for use in [facility]; reprinted with permission (margie@.[facility]). All rights reserved.      Do you have sleep apnea, excessive snoring or daytime drowsiness? : no    Reviewed and updated as needed this visit by clinical staff   Tobacco  Allergies  Meds              Reviewed and updated as needed this visit by Provider                 Social History     Tobacco Use    Smoking status: Former     Packs/day: 0.50     Years: 7.00     Additional pack years: 0.00     Total pack years: 3.50     Types: Cigarettes     Start date: 1970     Quit date: 1977     Years since quittin.3     Passive exposure: Past    Smokeless tobacco: Never   Substance Use Topics    Alcohol use: Yes     Comment: 5-7 glasses wine per week             2023    11:47 AM   Alcohol Use   Prescreen: >3 drinks/day or >7 drinks/week? No     Do you have a current opioid prescription? No  Do you use any other controlled substances or medications that are not prescribed by a provider? Alcohol              Current providers sharing in care for this patient include:   Patient Care Team:  Zoya Elizabeth MD as PCP - General (Family Medicine)  Zoya Elizabeth MD as Assigned PCP    The following health maintenance items are reviewed in Epic and correct as of today:  Health Maintenance   Topic Date Due    RSV VACCINE (Pregnancy & 60+) (1 - 1-dose 60+ series) Never done    ANNUAL REVIEW OF HM ORDERS  2022    COLORECTAL CANCER SCREENING  10/17/2023    BMP  2023    LIPID  2023    MEDICARE ANNUAL WELLNESS VISIT  2025    FALL RISK ASSESSMENT  2025    MAMMO SCREENING  2025    DTAP/TDAP/TD IMMUNIZATION (2 - Td or Tdap) 2028    ADVANCE CARE PLANNING  2029    DEXA  2034    HEPATITIS C SCREENING  Completed    PHQ-2 (once per calendar year)  Completed    INFLUENZA VACCINE  Completed    Pneumococcal Vaccine: 65+  "Years  Completed    ZOSTER IMMUNIZATION  Completed    COVID-19 Vaccine  Completed    IPV IMMUNIZATION  Aged Out    HPV IMMUNIZATION  Aged Out    MENINGITIS IMMUNIZATION  Aged Out    RSV MONOCLONAL ANTIBODY  Aged Out       Last 3 Pap and HPV Results:       8/4/2014    12:00 AM   PAP / HPV   PAP (Historical) NIL            Review of Systems   Constitutional:  Negative for chills and fever.   HENT:  Negative for congestion, ear pain, hearing loss and sore throat.    Eyes:  Negative for pain and visual disturbance.   Respiratory:  Negative for cough and shortness of breath.    Cardiovascular:  Negative for chest pain, palpitations and peripheral edema.   Gastrointestinal:  Negative for abdominal pain, constipation, diarrhea, heartburn, hematochezia and nausea.   Breasts:  Negative for tenderness, breast mass and discharge.   Genitourinary:  Negative for dysuria, frequency, genital sores, hematuria, pelvic pain, urgency, vaginal bleeding and vaginal discharge.   Musculoskeletal:  Positive for arthralgias. Negative for joint swelling and myalgias.   Skin:  Negative for rash.   Neurological:  Negative for dizziness, weakness, headaches and paresthesias.   Psychiatric/Behavioral:  Negative for mood changes. The patient is not nervous/anxious.         OBJECTIVE:   /80 (BP Location: Right arm, Patient Position: Sitting, Cuff Size: Adult Large)   Pulse 69   Temp 97.1  F (36.2  C) (Temporal)   Resp 18   Ht 1.645 m (5' 4.76\")   Wt 98.7 kg (217 lb 9.6 oz)   SpO2 100%   BMI 36.47 kg/m   Estimated body mass index is 36.47 kg/m  as calculated from the following:    Height as of this encounter: 1.645 m (5' 4.76\").    Weight as of this encounter: 98.7 kg (217 lb 9.6 oz).  Physical Exam  GENERAL APPEARANCE: healthy, alert and no distress  EYES: Eyes grossly normal to inspection, PERRL and conjunctivae and sclerae normal  HENT: ear canals and TM's normal, nose and mouth without ulcers or lesions, oropharynx clear and oral " mucous membranes moist  NECK: no adenopathy, no asymmetry, masses, or scars and thyroid normal to palpation  RESP: lungs clear to auscultation - no rales, rhonchi or wheezes  CV: regular rate and rhythm, normal S1 S2, no S3 or S4, no murmur, click or rub, no peripheral edema and peripheral pulses strong  ABDOMEN: soft, nontender, no hepatosplenomegaly, no masses and bowel sounds normal  MS: no musculoskeletal defects are noted and gait is age appropriate without ataxia  SKIN: no suspicious lesions or rashes  NEURO: Normal strength and tone, sensory exam grossly normal, mentation intact and speech normal  PSYCH: mentation appears normal and affect normal/bright    Diagnostic Test Results:  Labs reviewed in Epic    ASSESSMENT / PLAN:       ICD-10-CM    1. Encounter for Medicare annual wellness exam  Z00.00       2. Hyperlipidemia LDL goal <130  E78.5 Comprehensive metabolic panel (BMP + Alb, Alk Phos, ALT, AST, Total. Bili, TP)     Lipid panel reflex to direct LDL Fasting     atorvastatin (LIPITOR) 20 MG tablet     Comprehensive metabolic panel (BMP + Alb, Alk Phos, ALT, AST, Total. Bili, TP)     Lipid panel reflex to direct LDL Fasting      3. Hypertension goal BP (blood pressure) < 140/90  I10 Comprehensive metabolic panel (BMP + Alb, Alk Phos, ALT, AST, Total. Bili, TP)     Albumin Random Urine Quantitative with Creat Ratio     bisoprolol-hydrochlorothiazide (ZIAC) 2.5-6.25 MG tablet     Comprehensive metabolic panel (BMP + Alb, Alk Phos, ALT, AST, Total. Bili, TP)     Albumin Random Urine Quantitative with Creat Ratio      4. Need for vaccination against respiratory syncytial virus  Z29.11       5. Screening for colon cancer  Z12.11 WILLARD([employer])      Encounter for routine adult health examination without abnormal findings  Healthy   Mammo is scheduled later this month   Willard sent today     Flu shot and COVID vaccine completed for the season  RSV vaccine recommended - she may wait until next year  for the RSV vaccine      Hypertension goal BP (blood pressure) < 140/90  Initial bp elevated at 142/80. Will ask MA to recheck. Home bps has not been checking. No medication changes today. CMP and urine albumin for monitoring.   I recommended checking her bp at home periodically.   Continues bisoprolol--hydrochlorothiazide 2.5-6.25 mg daily. Schedule lab visit  - bisoprolol-hydrochlorothiazide (ZIAC) 2.5-6.25 MG tablet; TAKE 1 TABLET EVERY DAY  Dispense: 90 tablet; Refill: 3  -CMP  - Albumin Random Urine Quantitative with Creat Ratio     Hyperlipidemia LDL goal <130  stable.   Continues atorvastatin 20 mg daily.  CMP and lipids for medication monitoring     Colon cancer screening   Tan recommended      Osteopenia, left hip  DEXA completed 12/2019 showing left hip osteopenia. Repeat DEXA is due  12/19: FRAX 10-YEAR PROBABILITY OF FRACTURE*:  Major Osteoporotic: 8%  Hip: 1%  Recommended adequate calcium and vitamin D. See pt instructions.       Cold sore -- not discussed today   per previous wellness visit notes: Occasional cold sore.  She had previously used acyclovir ointment, however due to the expense she prefers not to use medication.  She typically gets about 1 cold sore per year.         COUNSELING:  Reviewed preventive health counseling, as reflected in patient instructions        She reports that she quit smoking about 46 years ago. Her smoking use included cigarettes. She started smoking about 53 years ago. She has a 3.5 pack-year smoking history. She has been exposed to tobacco smoke. She has never used smokeless tobacco.      Appropriate preventive services were discussed with this patient, including applicable screening as appropriate for fall prevention, nutrition, physical activity, Tobacco-use cessation, weight loss and cognition.  Checklist reviewing preventive services available has been given to the patient.    Reviewed patients plan of care and provided an AVS. The Intermediate Care Plan (  asthma action plan, low back pain action plan, and migraine action plan) for Serena meets the Care Plan requirement. This Care Plan has been established and reviewed with the Patient.          Zoya Elizabeth MD   [facility]    Identified Health Risks:  I have reviewed Opioid Use Disorder and Substance Use Disorder risk factors and made any needed referrals.

## 2024-01-10 RX ORDER — ATORVASTATIN CALCIUM 40 MG/1
40 TABLET, FILM COATED ORAL DAILY
Qty: 90 TABLET | Refills: 3 | Status: SHIPPED | OUTPATIENT
Start: 2024-01-10

## 2024-01-10 NOTE — RESULT ENCOUNTER NOTE
The results of your recent lipid (cholesterol) profile were abnormal.    Here are the results:  Lab Results       Component                Value               Date                       CHOL                     222                 01/03/2024                 CHOL                     191                 12/19/2019            Lab Results       Component                Value               Date                       HDL                      77                  01/03/2024                 HDL                      74                  12/19/2019            Lab Results       Component                Value               Date                       LDL                      125                 01/03/2024                 LDL                      101                 12/19/2019            Lab Results       Component                Value               Date                       TRIG                     101                 01/03/2024                 TRIG                     81                  12/19/2019            Lab Results       Component                Value               Date                       CHOLHDLRATIO             2.7                 10/16/2015              Desired or goal levels are:  CHOLESTEROL: Desirable is less than 200.   HDL (Good Cholesterol): Desirable is greater than 40 (for men) greater than 50 (for women).  LDL (Bad Cholesterol): Desirable is less than 130 (or less than 100 if you have heart disease or diabetes). Borderline 130-160.  TRIGLYCERIDES: Desirable is less than 150.  Borderline is 150-200.    **I think we could be doing a better job with your cholesterol. I have sent a prescription for a higher dose of atorvastatin (40mg daily) to your pharmacy. Let me know if you have any questions or issues with this higher dose. It is not likely that you will be able to tell a difference, but if you feel like you are having side effects, please let me know.     As you may know, an elevated cholesterol is one  factor that increases your risk for heart disease and stroke. You can improve your cholesterol by controlling the amount and type of fat you eat and by increasing your daily activity level.    Here are some ways to improve your nutrition:  Eat less fat (especially butter, Crisco and other saturated fats)  Buy lean cuts of meat, reduce your portions of red meat or substitute poultry or fish  Use skim milk and low-fat dairy products  Eat no more than 4 egg yolks per week  Avoid fried or fast foods that are high in fat  Eat more fruits and vegetables      Also consider starting or increasing your aerobic activity. Aerobic activity is the best way to improve HDL (good) cholesterol. If this would be new to you, please talk with me first about what activities are safe for you.      Other lab results:    The testing of your blood sugar, kidney function, liver function and electrolytes was normal.     Your urine protein test was normal.     Please feel free to contact us with any questions or if you would like more information.    Zoya Elizabeth M.D.

## 2024-01-29 ENCOUNTER — ANCILLARY PROCEDURE (OUTPATIENT)
Dept: MAMMOGRAPHY | Facility: CLINIC | Age: 74
End: 2024-01-29
Attending: FAMILY MEDICINE
Payer: COMMERCIAL

## 2024-01-29 DIAGNOSIS — Z12.31 VISIT FOR SCREENING MAMMOGRAM: ICD-10-CM

## 2024-01-29 PROCEDURE — 77067 SCR MAMMO BI INCL CAD: CPT | Mod: TC | Performed by: RADIOLOGY

## 2024-01-29 PROCEDURE — 77063 BREAST TOMOSYNTHESIS BI: CPT | Mod: TC | Performed by: RADIOLOGY

## 2024-02-09 LAB — NONINV COLON CA DNA+OCC BLD SCRN STL QL: NEGATIVE

## 2024-02-12 NOTE — RESULT ENCOUNTER NOTE
Excellent! Please call or send a [facility] message if you have any questions. Zoya Elizabeth M.D.

## 2024-12-04 ENCOUNTER — PATIENT OUTREACH (OUTPATIENT)
Dept: CARE COORDINATION | Facility: CLINIC | Age: 74
End: 2024-12-04
Payer: COMMERCIAL

## 2024-12-18 ENCOUNTER — PATIENT OUTREACH (OUTPATIENT)
Dept: CARE COORDINATION | Facility: CLINIC | Age: 74
End: 2024-12-18
Payer: COMMERCIAL

## 2025-01-30 ENCOUNTER — ANCILLARY PROCEDURE (OUTPATIENT)
Dept: MAMMOGRAPHY | Facility: CLINIC | Age: 75
End: 2025-01-30
Attending: FAMILY MEDICINE
Payer: COMMERCIAL

## 2025-01-30 DIAGNOSIS — Z12.31 VISIT FOR SCREENING MAMMOGRAM: ICD-10-CM

## 2025-01-30 PROCEDURE — 77067 SCR MAMMO BI INCL CAD: CPT | Mod: TC | Performed by: RADIOLOGY

## 2025-01-30 PROCEDURE — 77063 BREAST TOMOSYNTHESIS BI: CPT | Mod: TC | Performed by: RADIOLOGY

## 2025-02-02 SDOH — HEALTH STABILITY: PHYSICAL HEALTH: ON AVERAGE, HOW MANY MINUTES DO YOU ENGAGE IN EXERCISE AT THIS LEVEL?: 20 MIN

## 2025-02-02 SDOH — HEALTH STABILITY: PHYSICAL HEALTH: ON AVERAGE, HOW MANY DAYS PER WEEK DO YOU ENGAGE IN MODERATE TO STRENUOUS EXERCISE (LIKE A BRISK WALK)?: 3 DAYS

## 2025-02-02 ASSESSMENT — SOCIAL DETERMINANTS OF HEALTH (SDOH): HOW OFTEN DO YOU GET TOGETHER WITH FRIENDS OR RELATIVES?: TWICE A WEEK

## 2025-02-03 ENCOUNTER — OFFICE VISIT (OUTPATIENT)
Dept: FAMILY MEDICINE | Facility: CLINIC | Age: 75
End: 2025-02-03
Payer: COMMERCIAL

## 2025-02-03 VITALS
BODY MASS INDEX: 37.2 KG/M2 | OXYGEN SATURATION: 98 % | RESPIRATION RATE: 16 BRPM | HEART RATE: 66 BPM | HEIGHT: 65 IN | DIASTOLIC BLOOD PRESSURE: 88 MMHG | WEIGHT: 223.3 LBS | SYSTOLIC BLOOD PRESSURE: 172 MMHG | TEMPERATURE: 97.1 F

## 2025-02-03 DIAGNOSIS — I10 HYPERTENSION GOAL BP (BLOOD PRESSURE) < 140/90: ICD-10-CM

## 2025-02-03 DIAGNOSIS — M54.50 LEFT LOW BACK PAIN, UNSPECIFIED CHRONICITY, UNSPECIFIED WHETHER SCIATICA PRESENT: ICD-10-CM

## 2025-02-03 DIAGNOSIS — Z78.0 POST-MENOPAUSE: ICD-10-CM

## 2025-02-03 DIAGNOSIS — E78.5 DYSLIPIDEMIA: ICD-10-CM

## 2025-02-03 DIAGNOSIS — E66.01 MORBID OBESITY (H): ICD-10-CM

## 2025-02-03 DIAGNOSIS — H40.001 GLAUCOMA SUSPECT, RIGHT: ICD-10-CM

## 2025-02-03 DIAGNOSIS — Z00.00 ENCOUNTER FOR MEDICARE ANNUAL WELLNESS EXAM: Primary | ICD-10-CM

## 2025-02-03 DIAGNOSIS — M85.80 OSTEOPENIA, UNSPECIFIED LOCATION: ICD-10-CM

## 2025-02-03 LAB
ALBUMIN SERPL BCG-MCNC: 4.5 G/DL (ref 3.5–5.2)
ALP SERPL-CCNC: 50 U/L (ref 40–150)
ALT SERPL W P-5'-P-CCNC: 31 U/L (ref 0–50)
ANION GAP SERPL CALCULATED.3IONS-SCNC: 14 MMOL/L (ref 7–15)
AST SERPL W P-5'-P-CCNC: 36 U/L (ref 0–45)
BILIRUB SERPL-MCNC: 0.6 MG/DL
BUN SERPL-MCNC: 15.8 MG/DL (ref 8–23)
CALCIUM SERPL-MCNC: 10 MG/DL (ref 8.8–10.4)
CHLORIDE SERPL-SCNC: 103 MMOL/L (ref 98–107)
CHOLEST SERPL-MCNC: 179 MG/DL
CREAT SERPL-MCNC: 0.87 MG/DL (ref 0.51–0.95)
CREAT UR-MCNC: 81.3 MG/DL
EGFRCR SERPLBLD CKD-EPI 2021: 70 ML/MIN/1.73M2
FASTING STATUS PATIENT QL REPORTED: YES
FASTING STATUS PATIENT QL REPORTED: YES
GLUCOSE SERPL-MCNC: 97 MG/DL (ref 70–99)
HCO3 SERPL-SCNC: 24 MMOL/L (ref 22–29)
HDLC SERPL-MCNC: 79 MG/DL
LDLC SERPL CALC-MCNC: 88 MG/DL
MAGNESIUM SERPL-MCNC: 2.3 MG/DL (ref 1.7–2.3)
MICROALBUMIN UR-MCNC: <12 MG/L
MICROALBUMIN/CREAT UR: NORMAL MG/G{CREAT}
NONHDLC SERPL-MCNC: 100 MG/DL
PHOSPHATE SERPL-MCNC: 2.8 MG/DL (ref 2.5–4.5)
POTASSIUM SERPL-SCNC: 3.7 MMOL/L (ref 3.4–5.3)
PROT SERPL-MCNC: 7 G/DL (ref 6.4–8.3)
SODIUM SERPL-SCNC: 141 MMOL/L (ref 135–145)
TRIGL SERPL-MCNC: 62 MG/DL
VIT D+METAB SERPL-MCNC: 61 NG/ML (ref 20–50)

## 2025-02-03 PROCEDURE — 84100 ASSAY OF PHOSPHORUS: CPT | Performed by: FAMILY MEDICINE

## 2025-02-03 PROCEDURE — 80061 LIPID PANEL: CPT | Performed by: FAMILY MEDICINE

## 2025-02-03 PROCEDURE — 82570 ASSAY OF URINE CREATININE: CPT | Performed by: FAMILY MEDICINE

## 2025-02-03 PROCEDURE — 83735 ASSAY OF MAGNESIUM: CPT | Performed by: FAMILY MEDICINE

## 2025-02-03 PROCEDURE — G2211 COMPLEX E/M VISIT ADD ON: HCPCS | Performed by: FAMILY MEDICINE

## 2025-02-03 PROCEDURE — 36415 COLL VENOUS BLD VENIPUNCTURE: CPT | Performed by: FAMILY MEDICINE

## 2025-02-03 PROCEDURE — 82306 VITAMIN D 25 HYDROXY: CPT | Performed by: FAMILY MEDICINE

## 2025-02-03 PROCEDURE — 80053 COMPREHEN METABOLIC PANEL: CPT | Performed by: FAMILY MEDICINE

## 2025-02-03 PROCEDURE — G0439 PPPS, SUBSEQ VISIT: HCPCS | Performed by: FAMILY MEDICINE

## 2025-02-03 PROCEDURE — 82043 UR ALBUMIN QUANTITATIVE: CPT | Performed by: FAMILY MEDICINE

## 2025-02-03 PROCEDURE — 99214 OFFICE O/P EST MOD 30 MIN: CPT | Mod: 25 | Performed by: FAMILY MEDICINE

## 2025-02-03 RX ORDER — TIMOLOL MALEATE 2.5 MG/ML
SOLUTION/ DROPS OPHTHALMIC
COMMUNITY
Start: 2025-01-17

## 2025-02-03 RX ORDER — LISINOPRIL AND HYDROCHLOROTHIAZIDE 10; 12.5 MG/1; MG/1
1 TABLET ORAL DAILY
Qty: 90 TABLET | Refills: 3 | Status: SHIPPED | OUTPATIENT
Start: 2025-02-03

## 2025-02-03 RX ORDER — BISOPROLOL FUMARATE AND HYDROCHLOROTHIAZIDE 2.5; 6.25 MG/1; MG/1
1 TABLET ORAL DAILY
Qty: 90 TABLET | Refills: 3 | Status: CANCELLED | OUTPATIENT
Start: 2025-02-03

## 2025-02-03 RX ORDER — ATORVASTATIN CALCIUM 40 MG/1
40 TABLET, FILM COATED ORAL DAILY
Qty: 90 TABLET | Refills: 3 | Status: SHIPPED | OUTPATIENT
Start: 2025-02-03

## 2025-02-03 ASSESSMENT — PAIN SCALES - GENERAL: PAINLEVEL_OUTOF10: NO PAIN (0)

## 2025-02-03 NOTE — PROGRESS NOTES
Preventive Care Visit  [facility]  Zoya Elizabeth MD,  Family Medicine  Feb 3, 2025      Assessment & Plan       ICD-10-CM    1. Encounter for Medicare annual wellness exam  Z00.00       2. Hypertension goal BP (blood pressure) < 140/90  I10       3. Dyslipidemia  E78.5       4. Hyperlipidemia LDL goal <130  E78.5       5. Morbid obesity (H)  E66.01          Encounter for routine adult health examination without abnormal findings  Healthy   Mammo completed 1/25  Cologuard negative 1/24  Flu shot and COVID vaccine completed for the season   Bone health: recommended adequate calcium and vitamin D as well as twice weekly strength/resistance training.       Hypertension goal BP (blood pressure) < 140/90  Uncontrolled.  home bps- has not been checking.CMP and urine albumin for monitoring.   I recommended checking her bp at home daily for two weeks and periodically.     Will switch to lisinopril-hydrochlorothiazide 10-12.5mg daily.   Previously on bisoprolol--hydrochlorothiazide 2.5-6.25 mg daily, which she has been on for many years.  She states she started it very long ago and remembers being told that it was helpful for women, but denies any palpitations or arrhythmia. Denies any history of heart disease.      -CMP  - Albumin Random Urine Quantitative with Creat Ratio     Dyslipidemia  stable.   Continues atorvastatin 40 mg daily.  CMP and lipids for medication monitoring     Colon cancer screening   Cologuard completed 1/24     Osteopenia, left hip  DEXA completed 12/2019 showing left hip osteopenia. Repeat DEXA is due  12/19: FRAX 10-YEAR PROBABILITY OF FRACTURE*:  Major Osteoporotic: 8%  Hip: 1%  Recommended adequate calcium and vitamin D.      Cold sore   Denies any recent episodes. Does not remember last episode. Being in the sun seems to be a trigger for her.   Per previous wellness visit notes: Occasional cold sore.  She had previously used acyclovir ointment, however due to the  "expense she prefers not to use medication.  She typically gets about 1 cold sore per year.     Right eye open angle glaucoma suspect  continues every 6 months follow up with ophtho and is on timolol drops.       Morbid obesity -class 2 obesity with serious comorbidity HTN  Diet and exercise       BMI  Estimated body mass index is 37.16 kg/m  as calculated from the following:    Height as of this encounter: 1.651 m (5' 5\").    Weight as of this encounter: 101.3 kg (223 lb 4.8 oz).   Weight management plan: diet and exercise    Counseling  Appropriate preventive services were addressed with this patient via screening, questionnaire, or discussion as appropriate for fall prevention, nutrition, physical activity, Tobacco-use cessation, social engagement, weight loss and cognition.  Checklist reviewing preventive services available has been given to the patient.  Reviewed patient's diet, addressing concerns and/or questions.   She is at risk for lack of exercise and has been provided with information to increase physical activity for the benefit of her well-being.   Patient reported safety concerns were addressed today.       Vel Lyons is a 74 year old, presenting for the following:  Medicare Visit        2/3/2025    12:27 PM   Additional Questions   Roomed by Ruby STONER           SHIRA    Takes ibuprofen up to 800mg rarely if she has a backache. Has history of spondylolisthesis she reports and has occ back pain when she stands for long period of time.     She and her  are moving back to [place] from [place].  Moving into a ranch style home.  She is excited about the move.  Never felt like [place] became home.  Health Care Directive  Patient has a Health Care Directive on file  Advance care planning document is on file and is current.      2/2/2025   General Health   How would you rate your overall physical health? Good   Feel stress (tense, anxious, or unable to sleep) Only a little   (!) STRESS " CONCERN      2/2/2025   Nutrition   Diet: Regular (no restrictions)         2/2/2025   Exercise   Days per week of moderate/strenous exercise 3 days   Average minutes spent exercising at this level 20 min         2/2/2025   Social Factors   Frequency of gathering with friends or relatives Twice a week   Worry food won't last until get money to buy more No   Food not last or not have enough money for food? No   Do you have housing? (Housing is defined as stable permanent housing and does not include staying ouside in a car, in a tent, in an abandoned building, in an overnight shelter, or couch-surfing.) Yes   Are you worried about losing your housing? No   Lack of transportation? No   Unable to get utilities (heat,electricity)? Yes   Want help with housing or utility concern? No   (!) FINANCIAL RESOURCE STRAIN CONCERN      2/3/2025   Fall Risk   Fallen 2 or more times in the past year? No   Trouble with walking or balance? No   Gait Speed Test Interpretation Less than or equal to 5.00 seconds - PASS          2/2/2025   Activities of Daily Living- Home Safety   Needs help with the following daily activites None of the above   Safety concerns in the home No grab bars in the bathroom         2/2/2025   Dental   Dentist two times every year? Yes         2/2/2025   Hearing Screening   Hearing concerns? None of the above         2/2/2025   Driving Risk Screening   Patient/family members have concerns about driving No         2/2/2025   General Alertness/Fatigue Screening   Have you been more tired than usual lately? No         2/2/2025   Urinary Incontinence Screening   Bothered by leaking urine in past 6 months No         2/2/2025   TB Screening   Were you born outside of the [place]? No         Today's PHQ-2 Score:       2/2/2025     7:31 AM   PHQ-2 ( 1999 Pfizer)   Q1: Little interest or pleasure in doing things 0   Q2: Feeling down, depressed or hopeless 0   PHQ-2 Score 0    Q1: Little interest or pleasure in doing things  Not at all   Q2: Feeling down, depressed or hopeless Not at all   PHQ-2 Score 0       Patient-reported           2025   Substance Use   Alcohol more than 3/day or more than 7/wk No   Do you have a current opioid prescription? No   How severe/bad is pain from 1 to 10? 3/10   Do you use any other substances recreationally? No     Social History     Tobacco Use    Smoking status: Former     Current packs/day: 0.00     Average packs/day: 0.5 packs/day for 7.0 years (3.5 ttl pk-yrs)     Types: Cigarettes     Start date: 1970     Quit date: 1977     Years since quittin.4     Passive exposure: Past    Smokeless tobacco: Never   Vaping Use    Vaping status: Never Used   Substance Use Topics    Alcohol use: Yes     Comment: 5-7 glasses wine per week    Drug use: No           2025   LAST FHS-7 RESULTS   1st degree relative breast or ovarian cancer No   Any relative bilateral breast cancer Yes   Any male have breast cancer No   Any ONE woman have BOTH breast AND ovarian cancer No   Any woman with breast cancer before 50yrs Yes   2 or more relatives with breast AND/OR ovarian cancer No   2 or more relatives with breast AND/OR bowel cancer No        Mammogram Screening - Mammogram every 1-2 years updated in Health Maintenance based on mutual decision making    ASCVD Risk   The 10-year ASCVD risk score (Jr ALLEN, et al., 2019) is: 33.9%    Values used to calculate the score:      Age: 74 years      Sex: Female      Is Non- : No      Diabetic: No      Tobacco smoker: No      Systolic Blood Pressure: 178 mmHg      Is BP treated: Yes      HDL Cholesterol: 77 mg/dL      Total Cholesterol: 222 mg/dL           Reviewed and updated as needed this visit by Provider                    Past Medical History:   Diagnosis Date    Borderline abnormal thyroid function test 2012    Hyperlipidemia LDL goal < 130     Hypertension goal BP (blood pressure) < 140/90      Past Surgical  "History:   Procedure Laterality Date    COLONOSCOPY  2009    DILATION AND CURETTAGE      after missed AB    EYE SURGERY  7/2020     Current providers sharing in care for this patient include:  Patient Care Team:  Zoya Elizabeth MD as PCP - General (Family Medicine)  Zoya Elizabeth MD as Assigned PCP    The following health maintenance items are reviewed in Epic and correct as of today:  Health Maintenance   Topic Date Due    LUNG CANCER SCREENING  Never done    ANNUAL REVIEW OF HM ORDERS  11/09/2022    BMP  01/03/2025    LIPID  01/03/2025    COVID-19 Vaccine (9 - 2024-25 season) 04/07/2025    RSV VACCINE (1 - 1-dose 75+ series) 07/31/2025    MEDICARE ANNUAL WELLNESS VISIT  02/03/2026    FALL RISK ASSESSMENT  02/03/2026    GLUCOSE  01/03/2027    COLORECTAL CANCER SCREENING  01/27/2027    MAMMO SCREENING  01/30/2027    DTAP/TDAP/TD IMMUNIZATION (2 - Td or Tdap) 12/20/2028    ADVANCE CARE PLANNING  02/03/2030    DEXA  12/23/2034    HEPATITIS C SCREENING  Completed    PHQ-2 (once per calendar year)  Completed    INFLUENZA VACCINE  Completed    Pneumococcal Vaccine: 50+ Years  Completed    ZOSTER IMMUNIZATION  Completed    HPV IMMUNIZATION  Aged Out    MENINGITIS IMMUNIZATION  Aged Out    RSV MONOCLONAL ANTIBODY  Aged Out           Objective    Exam  BP (!) 178/90 (BP Location: Right arm, Patient Position: Sitting, Cuff Size: Adult Regular)   Pulse 66   Temp 97.1  F (36.2  C) (Temporal)   Resp 16   Ht 1.651 m (5' 5\")   Wt 101.3 kg (223 lb 4.8 oz)   SpO2 98%   BMI 37.16 kg/m     Estimated body mass index is 37.16 kg/m  as calculated from the following:    Height as of this encounter: 1.651 m (5' 5\").    Weight as of this encounter: 101.3 kg (223 lb 4.8 oz).    Physical Exam  GENERAL: alert and no distress  EYES: Eyes grossly normal to inspection, PERRL and conjunctivae and sclerae normal  HENT: ear canals and TM's normal, nose and mouth without ulcers or lesions  NECK: no adenopathy, no asymmetry, masses, or " scars  RESP: lungs clear to auscultation - no rales, rhonchi or wheezes  CV: regular rate and rhythm, normal S1 S2, no S3 or S4, no murmur, click or rub, no peripheral edema  ABDOMEN: soft, nontender, no hepatosplenomegaly, no masses and bowel sounds normal  MS: no gross musculoskeletal defects noted, no edema  SKIN: no suspicious lesions or rashes  NEURO: Normal strength and tone, mentation intact and speech normal  PSYCH: mentation appears normal, affect normal/bright        2/3/2025   Mini Cog   Clock Draw Score 2 Normal   3 Item Recall 3 objects recalled   Mini Cog Total Score 5              Signed Electronically by: Zoya Elizabeth MD

## 2025-02-03 NOTE — PATIENT INSTRUCTIONS
Patient Education   Schedule a bone density test (DEXA scan), preferably at [facility].    Aim for whole body strength/resistance training at least twice weekly.   When you have pain, you can try 400mg ibuprofen with food or milk instead of 800mg. You can also use tylenol 1000mg as needed up to three times per day.   Schedule PT for your low back pain if you are interested.   Check your blood pressure daily for three weeks and send your results on Telepathyt.   Start the new blood pressure medication lisinopril-hydrochlorothiazide 10-12.5mg daily.   Schedule a lab visit in about three weeks.   Things we should all do for bone health:      1.  CALCIUM Recommendation for women 51 and older 1200 mg/day (19-50 years 1000 mg/day) in divided doses of no more than 400-500 mg/dose. This includes both what is in your food AND what is in any supplements you are taking.  Read the labels carefully.    Some Dietary sources of calcium: These also contain vitamin D  Milk                            8 oz            300 mg  Yogurt                          1 cup           400 mg  Hard cheese                     1.5 oz          300 mg  Cottage cheese                  2 cup           300 mg  Orange juice with Calcium       8 oz            300 mg  Low fat dairy sources are recommended    2.  Adequate vitamin D    Recommended vitamin D intake  Over age 50: 800-1000 IU/day  Safe upper limit for vitamin D 4000 IU/day      Good dietary sources of vitamin D:  Fatty fish  liver  Egg yolks  Vitamin D fortified milk, juices, cereals    3.  Bone health exercise  A) 30 minutes of moderate exercise on most days of the week   Weight bearing exercise (ie, walking, jogging, hiking, dancing)    B) Strength training 2 or more times/week in addition to other weight -being exercise.    Strength training  uses weight or resistance beyond that seen in everyday activities -(pilates, weight training with free weights, weight machines or resistance  bands)    Weight supporting activities such as water aerobics, floor exercises and stationary cycling may be more appropriate for some patients with compromised bone health    Spinal fractures: AVOID activities that place significant force on the spine such as horse back riding,  tennis, golf or bowling    For OSTEOPOROSIS of the spine: avoid exercise machines that incorporate spinal flexion, trunk rotation, or forward bending   Specifically avoid abdominal exercisers, stationary bikes with moving handles, cross-country ski machines, rowing machines, and bicep curl machines   Preventive Care Advice   This is general advice given by our system to help you stay healthy. However, your care team may have specific advice just for you. Please talk to your care team about your preventive care needs.  Nutrition  Eat 5 or more servings of fruits and vegetables each day.  Try wheat bread, brown rice and whole grain pasta (instead of white bread, rice, and pasta).  Get enough calcium and vitamin D. Check the label on foods and aim for 100% of the RDA (recommended daily allowance).  Lifestyle  Exercise at least 150 minutes each week  (30 minutes a day, 5 days a week).  Do muscle strengthening activities 2 days a week. These help control your weight and prevent disease.  No smoking.  Wear sunscreen to prevent skin cancer.  Have a dental exam and cleaning every 6 months.  Yearly exams  See your health care team every year to talk about:  Any changes in your health.  Any medicines your care team has prescribed.  Preventive care, family planning, and ways to prevent chronic diseases.  Shots (vaccines)   HPV shots (up to age 26), if you've never had them before.  Hepatitis B shots (up to age 59), if you've never had them before.  COVID-19 shot: Get this shot when it's due.  Flu shot: Get a flu shot every year.  Tetanus shot: Get a tetanus shot every 10 years.  Pneumococcal, hepatitis A, and RSV shots: Ask your care team if you need  these based on your risk.  Shingles shot (for age 50 and up)  General health tests  Diabetes screening:  Starting at age 35, Get screened for diabetes at least every 3 years.  If you are younger than age 35, ask your care team if you should be screened for diabetes.  Cholesterol test: At age 39, start having a cholesterol test every 5 years, or more often if advised.  Bone density scan (DEXA): At age 50, ask your care team if you should have this scan for osteoporosis (brittle bones).  Hepatitis C: Get tested at least once in your life.  STIs (sexually transmitted infections)  Before age 24: Ask your care team if you should be screened for STIs.  After age 24: Get screened for STIs if you're at risk. You are at risk for STIs (including HIV) if:  You are sexually active with more than one person.  You don't use condoms every time.  You or a partner was diagnosed with a sexually transmitted infection.  If you are at risk for HIV, ask about PrEP medicine to prevent HIV.  Get tested for HIV at least once in your life, whether you are at risk for HIV or not.  Cancer screening tests  Cervical cancer screening: If you have a cervix, begin getting regular cervical cancer screening tests starting at age 21.  Breast cancer scan (mammogram): If you've ever had breasts, begin having regular mammograms starting at age 40. This is a scan to check for breast cancer.  Colon cancer screening: It is important to start screening for colon cancer at age 45.  Have a colonoscopy test every 10 years (or more often if you're at risk) Or, ask your provider about stool tests like a FIT test every year or Cologuard test every 3 years.  To learn more about your testing options, visit:   .  For help making a decision, visit:   https://bit.ly/jr08783.  Prostate cancer screening test: If you have a prostate, ask your care team if a prostate cancer screening test (PSA) at age 55 is right for you.  Lung cancer screening: If you are a current or  former smoker ages 50 to 80, ask your care team if ongoing lung cancer screenings are right for you.  For informational purposes only. Not to replace the advice of your health care provider. Copyright   2023 [facility]. All rights reserved. Clinically reviewed by the [facility]. [employer] 181880 - REV 01/24.  Learning About Activities of Daily Living  What are activities of daily living?     Activities of daily living (ADLs) are the basic self-care tasks you do every day. These include eating, bathing, dressing, and moving around.  As you age, and if you have health problems, you may find that it's harder to do some of these tasks. If so, your doctor can suggest ideas that may help.  To measure what kind of help you may need, your doctor will ask how well you are able to do ADLs. Let your doctor know if there are any tasks that you are having trouble doing. This is an important first step to getting help. And when you have the help you need, you can stay as independent as possible.  How will a doctor assess your ADLs?  Asking about ADLs is part of a routine health checkup your doctor will likely do as you age. Your health check might be done in a doctor's office, in your home, or at a hospital. The goal is to find out if you are having any problems that could make it hard to care for yourself or that make it unsafe for you to be on your own.  To measure your ADLs, your doctor will ask how hard it is for you to do routine tasks. Your doctor may also want to know if you have changed the way you do a task because of a health problem. Your doctor may watch how you:  Walk back and forth.  Keep your balance while you stand or walk.  Move from sitting to standing or from a bed to a chair.  Button or unbutton a shirt or sweater.  Remove and put on your shoes.  It's common to feel a little worried or anxious if you find you can't do all the things you used to be able to do. Talking  with your doctor about ADLs is a way to make sure you're as safe as possible and able to care for yourself as well as you can. You may want to bring a caregiver, friend, or family member to your checkup. They can help you talk to your doctor.  Follow-up care is a key part of your treatment and safety. Be sure to make and go to all appointments, and call your doctor if you are having problems. It's also a good idea to know your test results and keep a list of the medicines you take.  Current as of: October 24, 2023  Content Version: 14.3    2024 [employer].   Care instructions adapted under license by your healthcare professional. If you have questions about a medical condition or this instruction, always ask your healthcare professional. [employer] disclaims any warranty or liability for your use of this information.    Preventing Falls: Care Instructions  Injuries and health problems such as trouble walking or poor eyesight can increase your risk of falling. So can some medicines. But there are things you can do to help prevent falls. You can exercise to get stronger. You can also arrange your home to make it safer.    Talk to your doctor about the medicines you take. Ask if any of them increase the risk of falls and whether they can be changed or stopped.   Try to exercise regularly. It can help improve your strength and balance. This can help lower your risk of falling.         Practice fall safety and prevention.   Wear low-heeled shoes that fit well and give your feet good support. Talk to your doctor if you have foot problems that make this hard.  Carry a cellphone or wear a medical alert device that you can use to call for help.  Use stepladders instead of chairs to reach high objects. Don't climb if you're at risk for falls. Ask for help, if needed.  Wear the correct eyeglasses, if you need them.        Make your home safer.   Remove rugs, cords, clutter, and furniture from  "walkways.  Keep your house well lit. Use night-lights in hallways and bathrooms.  Install and use sturdy handrails on stairways.  Wear nonskid footwear, even inside. Don't walk barefoot or in socks without shoes.        Be safe outside.   Use handrails, curb cuts, and ramps whenever possible.  Keep your hands free by using a shoulder bag or backpack.  Try to walk in well-lit areas. Watch out for uneven ground, changes in pavement, and debris.  Be careful in the winter. Walk on the grass or gravel when sidewalks are slippery. Use de-icer on steps and walkways. Add non-slip devices to shoes.    Put grab bars and nonskid mats in your shower or tub and near the toilet. Try to use a shower chair or bath bench when bathing.   Get into a tub or shower by putting in your weaker leg first. Get out with your strong side first. Have a phone or medical alert device in the bathroom with you.   Where can you learn more?  Go to https://www.[employer].net/patiented  Enter G117 in the search box to learn more about \"Preventing Falls: Care Instructions.\"  Current as of: July 31, 2024  Content Version: 14.3    2024 [employer].   Care instructions adapted under license by your healthcare professional. If you have questions about a medical condition or this instruction, always ask your healthcare professional. [employer] disclaims any warranty or liability for your use of this information.       "

## 2025-02-04 ENCOUNTER — PATIENT OUTREACH (OUTPATIENT)
Dept: CARE COORDINATION | Facility: CLINIC | Age: 75
End: 2025-02-04
Payer: COMMERCIAL

## 2025-02-12 ENCOUNTER — MYC MEDICAL ADVICE (OUTPATIENT)
Dept: FAMILY MEDICINE | Facility: CLINIC | Age: 75
End: 2025-02-12
Payer: COMMERCIAL

## 2025-02-13 NOTE — TELEPHONE ENCOUNTER
I recommend waiting a week and then checking BP again daily for a week. If still high at that point, I will adjust her medication.  Zoya Elizabeth M.D.

## 2025-02-13 NOTE — TELEPHONE ENCOUNTER
: 2-12 value entered    2/5/25. 1100. 180/88 p. 68     2/6/25. 0610. 151/86 p. 66     2/7/25.   0700. 148/80 p. 68     2/8/25. 0545. 156/90 p. 71  Discontinued Biso/HCTZ.  Started Lisinopril/HCTZ.     2/9/25. 1700. 170/96 p. 86     2/10/25. 1645. 144/78. P. 82     2/11/25. Not taken.     2/12/25. 0500. 146/82. P. 83     Lab visit scheduled for 2/25/25  Waiting on Bone density and PT visits until after move to new home on 2/17/25.      LEYLA Mena, BSN, PHN, RN-[facility]  [phone]

## 2025-02-13 NOTE — TELEPHONE ENCOUNTER
Writer responded via [employer].  CELIA BoatengN, RN-BC  [facility]

## 2025-02-20 ENCOUNTER — DOCUMENTATION ONLY (OUTPATIENT)
Dept: LAB | Facility: CLINIC | Age: 75
End: 2025-02-20
Payer: COMMERCIAL

## 2025-02-20 DIAGNOSIS — I10 HYPERTENSION GOAL BP (BLOOD PRESSURE) < 140/90: Primary | ICD-10-CM

## 2025-02-20 NOTE — PROGRESS NOTES
Serena Reno has an upcoming lab appointment:    Future Appointments   Date Time Provider Department Center   2/25/2025  8:30 AM CS LAB [facility] CS   2/10/2026 11:00 AM Zoya Elizabeth MD [facility]     Patient has an upcoming lab appt (2/25) for follow up lab work. Please place orders as necessary. Thank you.     There is no order available. Please review and place either future orders or HMPO (Review of Health Maintenance Protocol Orders), as appropriate.    Health Maintenance Due   Topic    ANNUAL REVIEW OF HM ORDERS      Gloria Montanez

## 2025-02-25 ENCOUNTER — LAB (OUTPATIENT)
Dept: LAB | Facility: CLINIC | Age: 75
End: 2025-02-25
Payer: COMMERCIAL

## 2025-02-25 DIAGNOSIS — I10 HYPERTENSION GOAL BP (BLOOD PRESSURE) < 140/90: ICD-10-CM

## 2025-02-25 LAB
ANION GAP SERPL CALCULATED.3IONS-SCNC: 11 MMOL/L (ref 7–15)
BUN SERPL-MCNC: 18.3 MG/DL (ref 8–23)
CALCIUM SERPL-MCNC: 9.8 MG/DL (ref 8.8–10.4)
CHLORIDE SERPL-SCNC: 101 MMOL/L (ref 98–107)
CREAT SERPL-MCNC: 0.93 MG/DL (ref 0.51–0.95)
EGFRCR SERPLBLD CKD-EPI 2021: 64 ML/MIN/1.73M2
GLUCOSE SERPL-MCNC: 98 MG/DL (ref 70–99)
HCO3 SERPL-SCNC: 27 MMOL/L (ref 22–29)
POTASSIUM SERPL-SCNC: 4.1 MMOL/L (ref 3.4–5.3)
SODIUM SERPL-SCNC: 139 MMOL/L (ref 135–145)

## 2025-02-25 PROCEDURE — 36415 COLL VENOUS BLD VENIPUNCTURE: CPT

## 2025-02-25 PROCEDURE — 80048 BASIC METABOLIC PNL TOTAL CA: CPT

## 2025-02-26 NOTE — RESULT ENCOUNTER NOTE
Excellent! Please call or send a [employer] message if you have any questions. Zoya Elizabeth M.D.

## 2025-03-03 ENCOUNTER — NURSE TRIAGE (OUTPATIENT)
Dept: FAMILY MEDICINE | Facility: CLINIC | Age: 75
End: 2025-03-03
Payer: COMMERCIAL

## 2025-03-04 NOTE — TELEPHONE ENCOUNTER
Patient returned call and refused triage. She stated she does not think this is urgent or something that needs to be triaged for. She'd like a message sent to Dr. Elizabeth and advised on if she could take something else.      Dom Lo Cem Say, BSN RN  [facility]

## 2025-03-04 NOTE — TELEPHONE ENCOUNTER
"Patient reported vitals updated.    \"I moved 2/17/2025 to a different home.  I have been waiting to report how I m feeling on the Lisinopril  in order to separate my moving fatigue from how I m feeling in general.    I d say I m feeling more tired on the Lisinopril.  My heart feels like it s racing and sometimes pounding with no or only slight exertion.  I ve developed an occasional tickle/dry nonproductive cough at various times of the day and sometimes it appears during the night if I get up to urinate.  It continues unless I take a cough drop.    In conclusion, I don t like how I m feeling on the new med, Lisinopril.  I m wondering if my previous medicine Biso/HCTZ has a different dosage to try or if I need to try a completely different medication for better B/P control.  Thank you.\"      Left message to call back and ask to speak with an available nurse.      CELIA BoatengN, RN-[facility]          "

## 2025-03-05 ENCOUNTER — VIRTUAL VISIT (OUTPATIENT)
Dept: FAMILY MEDICINE | Facility: CLINIC | Age: 75
End: 2025-03-05
Payer: COMMERCIAL

## 2025-03-05 DIAGNOSIS — I10 HYPERTENSION GOAL BP (BLOOD PRESSURE) < 140/90: ICD-10-CM

## 2025-03-05 DIAGNOSIS — I10 HYPERTENSION GOAL BP (BLOOD PRESSURE) < 140/90: Primary | ICD-10-CM

## 2025-03-05 PROCEDURE — 98006 SYNCH AUDIO-VIDEO EST MOD 30: CPT | Performed by: FAMILY MEDICINE

## 2025-03-05 RX ORDER — BISOPROLOL FUMARATE AND HYDROCHLOROTHIAZIDE 2.5; 6.25 MG/1; MG/1
1 TABLET ORAL DAILY
Qty: 90 TABLET | Refills: 3 | Status: SHIPPED | OUTPATIENT
Start: 2025-03-05

## 2025-03-05 RX ORDER — LISINOPRIL 10 MG/1
10 TABLET ORAL DAILY
Qty: 30 TABLET | Refills: 1 | Status: SHIPPED | OUTPATIENT
Start: 2025-03-05

## 2025-03-05 NOTE — PATIENT INSTRUCTIONS
Based on our discussion, I have outlined the following instructions for you:    - Restart taking the bisoprolol hydrochlorothiazide combination as prescribed.  - Stop taking the lisinopril hydrochlorothiazide combination.  - Begin taking lisinopril 10 mg once a day.  - Check your blood pressure every day.  - After one week, send a message with your blood pressure readings through [employer] to discuss next steps.    Thank you again for your visit, and we look forward to supporting you in your journey to better health.

## 2025-03-05 NOTE — TELEPHONE ENCOUNTER
Please schedule telephone or video visit with me tomorrow and we can discuss her symptoms and options. Thanks, Zoya Elizabeth M.D.

## 2025-03-05 NOTE — PROGRESS NOTES
Serena is a 74 year old who is being evaluated via a billable video visit.    How would you like to obtain your AVS? MyChart  If the video visit is dropped, the invitation should be resent by: Send to e-mail at: [email or web address]  Will anyone else be joining your video visit? No      Assessment & Plan        Assessment & Plan  Hypertension goal BP (blood pressure) < 140/90  - Current blood pressure is 153/89 with a heart rate in the high 80s to 90s. Symptoms may be due to the absence of bisoprolol rather than the effect of lisinopril.  - Restart bisoprolol hydrochlorothiazide combination. Discontinue lisinopril hydrochlorothiazide combination. Start individual lisinopril 10 mg daily. Monitor blood pressure daily. Send [employer] message with blood pressure readings in a week to decide on further refills or need for a visit.    Results from previous BMP reviewed with Serena today          Subjective   Serena is a 74 year old, presenting for the following health issues:  Blood Pressure Check (BP Concerns )        3/5/2025    10:28 AM   Additional Questions   Roomed by Marjan Esteban   Accompanied by Self     Video Start Time: 10:45 AM    History of Present Illness       Hypertension: She presents for follow up of hypertension.  She does check blood pressure  regularly outside of the clinic. Outside blood pressures have been over 140/90. She does not follow a low salt diet.        History of Present Illness-  Serena Reno, 74 years    Heart Rate and Blood Pressure Issues  Serena reports feeling fatigued since moving on February 17, 2025. She has been experiencing episodes where her heart feels like it is racing, with her pulse running in the high 80s to 90s. Her blood pressure was recorded at 153/89, which she notes is not at her target level. She has not measured her heart rate during these episodes but describes a sensation of her chest pounding, even without exertion. She does not experience chest pain or shortness of  breath during these episodes but does feel lightheaded if she moves too quickly. Her metabolic panel was checked after starting a new medication, and her potassium, electrolytes, and kidney function were normal. She was previously on bisoprolol, which helped maintain a lower heart rate, and she is concerned about the absence of this medication affecting her current symptoms. Serena's fatigue is also attributed to her recent move on February 17, 2025, which has been a significant change in her life.   .   Serena Reno to [facility] [place] (supporting [facility])         3/3/25  2:08 PM  2/24 146/88 p. 84 @ 0700  2/25 157/86 p. 74 @ 0610  2/26 156/80 p. 80 @ 1630  2/27--not taken  2/28 160/95 p. 80 @1615  3/1 139/82 p. 93 @0620  3/2 140/86 p. 97 @ 0910  3/3 138/93 p. 75 @0715     I moved 2/17/2025 to a different home.  I have been waiting to report how I m feeling on the Lisinopril  in order to separate my moving fatigue from how I m feeling in general.    I d say I m feeling more tired on the Lisinopril.  My heart feels like it s racing and sometimes pounding with no or only slight exertion.  I ve developed an occasional tickle/dry nonproductive cough at various times of the day and sometimes it appears during the night if I get up to urinate.  It continues unless I take a cough drop.    In conclusion, I don t like how I m feeling on the new med, Lisinopril.  I m wondering if my previous medicine Biso/HCTZ has a different dosage to try or if I need to try a completely different medication for better B/P control.  Thank you.         Objective             Physical Exam   GENERAL: alert and no distress  EYES: Eyes grossly normal to inspection.  No discharge or erythema, or obvious scleral/conjunctival abnormalities.  RESP: No audible wheeze, cough, or visible cyanosis.    SKIN: Visible skin clear. No significant rash, abnormal pigmentation or lesions.  NEURO: Cranial nerves grossly intact.  Mentation  and speech appropriate for age.  PSYCH: Appropriate affect, tone, and pace of words    Lab on 02/25/2025   Component Date Value Ref Range Status    Sodium 02/25/2025 139  135 - 145 mmol/L Final    Potassium 02/25/2025 4.1  3.4 - 5.3 mmol/L Final    Chloride 02/25/2025 101  98 - 107 mmol/L Final    Carbon Dioxide (CO2) 02/25/2025 27  22 - 29 mmol/L Final    Anion Gap 02/25/2025 11  7 - 15 mmol/L Final    Urea Nitrogen 02/25/2025 18.3  8.0 - 23.0 mg/dL Final    Creatinine 02/25/2025 0.93  0.51 - 0.95 mg/dL Final    GFR Estimate 02/25/2025 64  >60 mL/min/1.73m2 Final    eGFR calculated using 2021 CKD-EPI equation.    Calcium 02/25/2025 9.8  8.8 - 10.4 mg/dL Final    Glucose 02/25/2025 98  70 - 99 mg/dL Final         Video-Visit Details    Type of service:  Video Visit   Video End Time:10:58 AM  Originating Location (pt. Location): Home    Distant Location (provider location):  On-site  Platform used for Video Visit: Gerard  Signed Electronically by: Zoya Elizabeth MD

## 2025-03-06 RX ORDER — LISINOPRIL 10 MG/1
10 TABLET ORAL DAILY
Qty: 30 TABLET | Refills: 1 | OUTPATIENT
Start: 2025-03-06

## 2025-03-17 ENCOUNTER — MYC MEDICAL ADVICE (OUTPATIENT)
Dept: FAMILY MEDICINE | Facility: CLINIC | Age: 75
End: 2025-03-17
Payer: COMMERCIAL

## 2025-03-17 DIAGNOSIS — I10 HYPERTENSION GOAL BP (BLOOD PRESSURE) < 140/90: ICD-10-CM

## 2025-03-19 ENCOUNTER — TELEPHONE (OUTPATIENT)
Dept: FAMILY MEDICINE | Facility: CLINIC | Age: 75
End: 2025-03-19
Payer: COMMERCIAL

## 2025-03-19 RX ORDER — LISINOPRIL 10 MG/1
10 TABLET ORAL DAILY
Qty: 30 TABLET | Refills: 1 | Status: SHIPPED | OUTPATIENT
Start: 2025-03-19

## 2025-03-19 NOTE — TELEPHONE ENCOUNTER
From 3/5/25 visit:    Assessment & Plan  Assessment & Plan  Hypertension goal BP (blood pressure) < 140/90  - Current blood pressure is 153/89 with a heart rate in the high 80s to 90s. Symptoms may be due to the absence of bisoprolol rather than the effect of lisinopril.  - Restart bisoprolol hydrochlorothiazide combination. Discontinue lisinopril hydrochlorothiazide combination. Start individual lisinopril 10 mg daily. Monitor blood pressure daily. Send [employer] message with blood pressure readings in a week to decide on further refills or need for a visit.    Please notify pt: lisinopril as a stand alone along with bisoprolol-hydrochlorothiazide    Cecily CHRISTIANSON BSN, PHN, RN-[facility]  [phone]

## 2025-03-19 NOTE — TELEPHONE ENCOUNTER
Medication Question or Refill        What medication are you calling about (include dose and sig)?: lisinopril (ZESTRIL) 10 MG tablet     Preferred Pharmacy:[facility] - [place], [place] - [address] AT [address] [place]  [address] [facility] [address]  Phone: [phone] Fax: [phone] Alternate Fax: [phone]      Who prescribed the medication?: Zoya Elizabeth    Do you need a refill? Yes    Do you have any questions or concerns?  Yes: Patient has history on lisinopril/hydrochlorothiazide 10/12.5MG. Does this rx for lisinopril 10mg replace the rx for lisinopril/hydrochlorothiazide or is patient taking both? Please advise

## 2025-03-19 NOTE — TELEPHONE ENCOUNTER
Most recent BP and heart rate reading entered into patient reported vitals.    Lisinopril resent to mail order pharmacy per patient's request.        Writer responded via [employer].    HAYDEE Boateng, RN-[facility]

## 2025-03-20 NOTE — TELEPHONE ENCOUNTER
Writer called Serena and relayed message that lisinopril has been ordered on 3/19/2025. Patient verified that she is taking the bisoprolol-hydrochlorothiazide.   Is waiting for the lisinopril to come in the mail.     Patient will do an e-visit with Dr. Elizabeth if having side effects from Lisinopril.     HAYDEE Hector RN  [facility]

## 2025-04-17 ENCOUNTER — MYC MEDICAL ADVICE (OUTPATIENT)
Dept: FAMILY MEDICINE | Facility: CLINIC | Age: 75
End: 2025-04-17
Payer: COMMERCIAL

## 2025-04-17 DIAGNOSIS — I10 HYPERTENSION GOAL BP (BLOOD PRESSURE) < 140/90: Primary | ICD-10-CM

## 2025-04-17 NOTE — TELEPHONE ENCOUNTER
Dr. Elizabeth - see MyChart, pt notes ACE inhibitor cough. Appreciate review and advisement.    Most recent pt reported BP entered into chart.    LEYLA Thakkar, BSN, PHN, AMB-BC (she/her)  [facility] RN

## 2025-04-21 NOTE — TELEPHONE ENCOUNTER
Because of the cough, I recommend stopping lisinopril and starting losartan 25mg daily. Let Serena know that it can take 3 or 4 weeks before the tickle cough resolves after stopping lisinopril. Let me know if any concerns/side effects on losartan.  I pended the rx to be sent to the pharmacy of her choice. Will start with #30 to see if she tolerates it before filling 3 month supply. Most recent BPs look good.     Thanks, Zoya Elizabeth M.D.

## 2025-04-22 NOTE — TELEPHONE ENCOUNTER
Dr. Elizabeth-Please review and sign if agree.  Drug interaction warning so RN cannot authorize order.    Thank you!  CELIA BoatengN, RN-[facility]

## 2025-04-23 RX ORDER — LOSARTAN POTASSIUM 25 MG/1
25 TABLET ORAL DAILY
Qty: 30 TABLET | Refills: 1 | Status: SHIPPED | OUTPATIENT
Start: 2025-04-23

## 2025-04-23 NOTE — TELEPHONE ENCOUNTER
Writer responded via [employer].  Cecily CHRISTIANSON, BSN, PHN, RN-[facility]  [phone]

## 2025-06-05 ENCOUNTER — MYC MEDICAL ADVICE (OUTPATIENT)
Dept: FAMILY MEDICINE | Facility: CLINIC | Age: 75
End: 2025-06-05
Payer: COMMERCIAL

## 2025-06-05 DIAGNOSIS — I10 HYPERTENSION GOAL BP (BLOOD PRESSURE) < 140/90: ICD-10-CM

## 2025-06-05 RX ORDER — LOSARTAN POTASSIUM 50 MG/1
50 TABLET ORAL DAILY
Qty: 90 TABLET | Refills: 0 | Status: SHIPPED | OUTPATIENT
Start: 2025-06-05

## 2025-06-05 RX ORDER — LOSARTAN POTASSIUM 25 MG/1
25 TABLET ORAL DAILY
Qty: 30 TABLET | Refills: 1 | Status: CANCELLED | OUTPATIENT
Start: 2025-06-05

## 2025-06-05 NOTE — TELEPHONE ENCOUNTER
Writer relayed PCP's message to pt via [employer]. Recent BP 6/5. 132/86 p. 68 @0900 documented in Pt Reported Vitals.     Rex Mehta, CELIAN, PHN, RN-[facility]

## 2025-06-27 ENCOUNTER — MYC MEDICAL ADVICE (OUTPATIENT)
Dept: FAMILY MEDICINE | Facility: CLINIC | Age: 75
End: 2025-06-27

## 2025-06-27 DIAGNOSIS — I10 HYPERTENSION GOAL BP (BLOOD PRESSURE) < 140/90: Primary | ICD-10-CM

## 2025-06-30 ENCOUNTER — RESULTS FOLLOW-UP (OUTPATIENT)
Dept: FAMILY MEDICINE | Facility: CLINIC | Age: 75
End: 2025-06-30

## 2025-06-30 NOTE — TELEPHONE ENCOUNTER
RN - Please call Serena. BP readings aren't too bad, but could be better. I recommend increasing the losartan dose to 100mg daily and scheduling another lab visit in about 2-3 weeks. Continue checking bp daily and report readings when she comes for lab visit. Her metabolic panel looked good.     Zoya Elizabeth M.D.

## 2025-06-30 NOTE — TELEPHONE ENCOUNTER
Writer responded via [employer].    CELIA BoatengN, RN-BC  [facility]

## 2025-07-01 RX ORDER — LOSARTAN POTASSIUM 100 MG/1
100 TABLET ORAL DAILY
Qty: 30 TABLET | Refills: 0 | Status: SHIPPED | OUTPATIENT
Start: 2025-07-01

## 2025-07-11 ENCOUNTER — DOCUMENTATION ONLY (OUTPATIENT)
Dept: LAB | Facility: CLINIC | Age: 75
End: 2025-07-11
Payer: COMMERCIAL

## 2025-07-11 DIAGNOSIS — I10 HYPERTENSION GOAL BP (BLOOD PRESSURE) < 140/90: Primary | ICD-10-CM

## 2025-07-11 NOTE — PROGRESS NOTES
Patient has an upcomming lab appointment with no orders.   Please order labs as needed.    Thank you

## 2025-07-22 ENCOUNTER — LAB (OUTPATIENT)
Dept: LAB | Facility: CLINIC | Age: 75
End: 2025-07-22
Payer: COMMERCIAL

## 2025-07-22 ENCOUNTER — MYC MEDICAL ADVICE (OUTPATIENT)
Dept: FAMILY MEDICINE | Facility: CLINIC | Age: 75
End: 2025-07-22

## 2025-07-22 DIAGNOSIS — I10 HYPERTENSION GOAL BP (BLOOD PRESSURE) < 140/90: ICD-10-CM

## 2025-07-22 LAB
ANION GAP SERPL CALCULATED.3IONS-SCNC: 10 MMOL/L (ref 7–15)
BUN SERPL-MCNC: 18.1 MG/DL (ref 8–23)
CALCIUM SERPL-MCNC: 10.1 MG/DL (ref 8.8–10.4)
CHLORIDE SERPL-SCNC: 106 MMOL/L (ref 98–107)
CREAT SERPL-MCNC: 0.88 MG/DL (ref 0.51–0.95)
EGFRCR SERPLBLD CKD-EPI 2021: 69 ML/MIN/1.73M2
GLUCOSE SERPL-MCNC: 89 MG/DL (ref 70–99)
HCO3 SERPL-SCNC: 25 MMOL/L (ref 22–29)
POTASSIUM SERPL-SCNC: 4.4 MMOL/L (ref 3.4–5.3)
SODIUM SERPL-SCNC: 141 MMOL/L (ref 135–145)

## 2025-07-22 PROCEDURE — 36415 COLL VENOUS BLD VENIPUNCTURE: CPT

## 2025-07-22 PROCEDURE — 80048 BASIC METABOLIC PNL TOTAL CA: CPT

## 2025-07-23 NOTE — TELEPHONE ENCOUNTER
-- Most recent blood pressure entered under patient reported vital signs. Please review and advise    Louise Rouse RN  [facility]

## 2025-07-24 DIAGNOSIS — I10 HYPERTENSION GOAL BP (BLOOD PRESSURE) < 140/90: ICD-10-CM

## 2025-07-24 NOTE — TELEPHONE ENCOUNTER
Dr. Elizabeth--- please review pt's mychart message and send in refill for losartan 100 mg. Looked like this was a dose increased and was only prescribed for 30 days from previous script. Pended order. Thank you!    Rex Mehta, CELIAN, PHN, RN-[facility]

## 2025-07-28 ENCOUNTER — MYC REFILL (OUTPATIENT)
Dept: FAMILY MEDICINE | Facility: CLINIC | Age: 75
End: 2025-07-28
Payer: COMMERCIAL

## 2025-07-28 DIAGNOSIS — I10 HYPERTENSION GOAL BP (BLOOD PRESSURE) < 140/90: ICD-10-CM

## 2025-07-28 RX ORDER — LOSARTAN POTASSIUM 100 MG/1
100 TABLET ORAL DAILY
Qty: 90 TABLET | Refills: 1 | Status: SHIPPED | OUTPATIENT
Start: 2025-07-28

## 2025-07-28 RX ORDER — LOSARTAN POTASSIUM 100 MG/1
100 TABLET ORAL DAILY
Qty: 30 TABLET | Refills: 0 | Status: CANCELLED | OUTPATIENT
Start: 2025-07-28